# Patient Record
Sex: FEMALE | Race: WHITE | NOT HISPANIC OR LATINO | Employment: FULL TIME | URBAN - METROPOLITAN AREA
[De-identification: names, ages, dates, MRNs, and addresses within clinical notes are randomized per-mention and may not be internally consistent; named-entity substitution may affect disease eponyms.]

---

## 2019-10-26 ENCOUNTER — OFFICE VISIT (OUTPATIENT)
Dept: URGENT CARE | Facility: CLINIC | Age: 34
End: 2019-10-26
Payer: COMMERCIAL

## 2019-10-26 VITALS
BODY MASS INDEX: 18.48 KG/M2 | SYSTOLIC BLOOD PRESSURE: 127 MMHG | HEIGHT: 66 IN | RESPIRATION RATE: 17 BRPM | TEMPERATURE: 98 F | OXYGEN SATURATION: 99 % | HEART RATE: 96 BPM | DIASTOLIC BLOOD PRESSURE: 85 MMHG | WEIGHT: 115 LBS

## 2019-10-26 DIAGNOSIS — Z00.00 ENCOUNTER FOR MEDICAL EXAMINATION TO ESTABLISH CARE: ICD-10-CM

## 2019-10-26 DIAGNOSIS — R07.9 CHEST PAIN, UNSPECIFIED TYPE: Primary | ICD-10-CM

## 2019-10-26 DIAGNOSIS — R09.89 CHEST CONGESTION: ICD-10-CM

## 2019-10-26 DIAGNOSIS — J06.9 UPPER RESPIRATORY TRACT INFECTION, UNSPECIFIED TYPE: ICD-10-CM

## 2019-10-26 LAB
CTP QC/QA: YES
FLUAV AG NPH QL: NEGATIVE
FLUBV AG NPH QL: NEGATIVE

## 2019-10-26 PROCEDURE — 93005 EKG 12-LEAD: ICD-10-PCS | Mod: S$GLB,,, | Performed by: NURSE PRACTITIONER

## 2019-10-26 PROCEDURE — 93010 ELECTROCARDIOGRAM REPORT: CPT | Mod: S$GLB,,, | Performed by: INTERNAL MEDICINE

## 2019-10-26 PROCEDURE — 87804 INFLUENZA ASSAY W/OPTIC: CPT | Mod: QW,S$GLB,, | Performed by: NURSE PRACTITIONER

## 2019-10-26 PROCEDURE — 99203 PR OFFICE/OUTPT VISIT, NEW, LEVL III, 30-44 MIN: ICD-10-PCS | Mod: S$GLB,,, | Performed by: NURSE PRACTITIONER

## 2019-10-26 PROCEDURE — 87804 POCT INFLUENZA A/B: ICD-10-PCS | Mod: 59,QW,S$GLB, | Performed by: NURSE PRACTITIONER

## 2019-10-26 PROCEDURE — 71046 X-RAY EXAM CHEST 2 VIEWS: CPT | Mod: S$GLB,,, | Performed by: RADIOLOGY

## 2019-10-26 PROCEDURE — 99203 OFFICE O/P NEW LOW 30 MIN: CPT | Mod: S$GLB,,, | Performed by: NURSE PRACTITIONER

## 2019-10-26 PROCEDURE — 93005 ELECTROCARDIOGRAM TRACING: CPT | Mod: S$GLB,,, | Performed by: NURSE PRACTITIONER

## 2019-10-26 PROCEDURE — 71046 XR CHEST PA AND LATERAL: ICD-10-PCS | Mod: S$GLB,,, | Performed by: RADIOLOGY

## 2019-10-26 PROCEDURE — 3008F PR BODY MASS INDEX (BMI) DOCUMENTED: ICD-10-PCS | Mod: CPTII,S$GLB,, | Performed by: NURSE PRACTITIONER

## 2019-10-26 PROCEDURE — 3008F BODY MASS INDEX DOCD: CPT | Mod: CPTII,S$GLB,, | Performed by: NURSE PRACTITIONER

## 2019-10-26 PROCEDURE — 93010 EKG 12-LEAD: ICD-10-PCS | Mod: S$GLB,,, | Performed by: INTERNAL MEDICINE

## 2019-10-26 RX ORDER — SERTRALINE HYDROCHLORIDE 50 MG/1
25 TABLET, FILM COATED ORAL DAILY
Refills: 3 | COMMUNITY
Start: 2019-10-05 | End: 2020-05-11 | Stop reason: SINTOL

## 2019-10-26 NOTE — PROGRESS NOTES
"Subjective:       Patient ID: Hayley Epstein is a 33 y.o. female.    Vitals:  height is 5' 6" (1.676 m) and weight is 52.2 kg (115 lb). Her temperature is 98.2 °F (36.8 °C). Her blood pressure is 127/85 and her pulse is 96. Her respiration is 17 and oxygen saturation is 99%.     Chief Complaint: Chest Pain (midline/right side ) and Generalized Body Aches    Pt says she has been battling cough/cold symptoms on and off for 2 weeks, got better in between but then developed fatigue, chest soreness, sinus congestion. Has been using tylenol as needed and using advair but no change or improvement. Pt is a pulmonogist. Lack of appetite and energy. Kids and  have been sick. Did NOT get the flu vaccine. Off and on diarrhea for a month with hx of ibs. Denies any cardiac hx.  Reports substernal/right sided chest pain/more with stretching/movement. No change with leaning forward or back. No hx of pericarditis. Denies dizzy/lightheaded.   Denies lower back pain.     Chest Pain    This is a new problem. The current episode started yesterday. The onset quality is sudden. The problem occurs constantly. The problem has been gradually worsening. The pain is present in the substernal region. The quality of the pain is described as sharp, stabbing and tightness. The pain radiates to the right shoulder. Associated symptoms include a cough, diaphoresis, headaches and sputum production. Pertinent negatives include no dizziness, fever, hemoptysis, nausea, shortness of breath or vomiting.   Pertinent negatives for past medical history include no COPD.       Constitution: Positive for appetite change, chills, sweating and fatigue. Negative for fever.   HENT: Positive for congestion and postnasal drip. Negative for ear pain, ear discharge, sinus pain, sinus pressure, sore throat and voice change.    Neck: Negative for painful lymph nodes.   Cardiovascular: Positive for chest pain.   Eyes: Negative for eye redness.   Respiratory: " Positive for chest tightness, cough and sputum production. Negative for bloody sputum, COPD, shortness of breath, stridor, wheezing and asthma.    Gastrointestinal: Positive for diarrhea. Negative for nausea, vomiting and constipation.   Genitourinary: Negative for dysuria, frequency and urgency.   Musculoskeletal: Positive for pain and muscle ache.   Skin: Negative for rash and bruising.   Allergic/Immunologic: Negative for seasonal allergies and asthma.   Neurological: Positive for headaches. Negative for dizziness, light-headedness and coordination disturbances.   Hematologic/Lymphatic: Negative for swollen lymph nodes.       Objective:      Physical Exam   Constitutional: She is oriented to person, place, and time. She appears well-developed and well-nourished. She is cooperative.  Non-toxic appearance. She does not have a sickly appearance. She does not appear ill. No distress.   HENT:   Head: Normocephalic and atraumatic.   Right Ear: Hearing, tympanic membrane, external ear and ear canal normal.   Left Ear: Hearing, tympanic membrane, external ear and ear canal normal.   Nose: Mucosal edema and rhinorrhea present. No nasal deformity. No epistaxis. Right sinus exhibits no maxillary sinus tenderness and no frontal sinus tenderness. Left sinus exhibits no maxillary sinus tenderness and no frontal sinus tenderness.   Mouth/Throat: Uvula is midline and mucous membranes are normal. No trismus in the jaw. Normal dentition. No uvula swelling. Posterior oropharyngeal edema and posterior oropharyngeal erythema present. No oropharyngeal exudate. No tonsillar exudate.   Eyes: Conjunctivae and lids are normal. No scleral icterus.   Neck: Trachea normal, full passive range of motion without pain and phonation normal. Neck supple. No neck rigidity. No edema and no erythema present.   Cardiovascular: Normal rate, regular rhythm, normal heart sounds, intact distal pulses and normal pulses.   Pulses:       Radial pulses are 2+  on the right side, and 2+ on the left side.   12 lead ekg: nsr, no stemi noted.   Pulmonary/Chest: Effort normal and breath sounds normal. No stridor. No respiratory distress. She has no decreased breath sounds. She has no wheezes. She has no rhonchi.   Abdominal: Normal appearance.   Musculoskeletal: Normal range of motion. She exhibits no edema or deformity.   Neurological: She is alert and oriented to person, place, and time. She exhibits normal muscle tone. Coordination normal.   Skin: Skin is warm, dry, intact, not diaphoretic and not pale.   Psychiatric: She has a normal mood and affect. Her speech is normal and behavior is normal. Judgment and thought content normal. Cognition and memory are normal.   Nursing note and vitals reviewed.    X-ray Chest Pa And Lateral    Result Date: 10/26/2019  EXAMINATION: XR CHEST PA AND LATERAL CLINICAL HISTORY: ex. health; Chest pain, unspecified TECHNIQUE: PA and lateral views of the chest were performed. COMPARISON: None FINDINGS: The lungs are clear, with normal appearance of pulmonary vasculature and no pleural effusion or pneumothorax. The cardiac silhouette is normal in size. The hilar and mediastinal contours are unremarkable. Mild dextrocurvature of the thoracic spine.  Osseous structures are otherwise intact.     No radiographic acute intrathoracic process seen. Electronically signed by: Dani Winslow MD Date:    10/26/2019 Time:    14:32        Assessment:       1. Chest pain, unspecified type    2. Chest congestion    3. Upper respiratory tract infection, unspecified type    4. Encounter for medical examination to establish care        Plan:     discussed viral vs bacterial. Recommend close f/u. Ref to int med placed. nsaids to treat for possible chest wall strain.    Chest pain, unspecified type  -     IN OFFICE EKG 12-LEAD (to Muse)  -     X-Ray Chest PA And Lateral; Future; Expected date: 10/26/2019  -     POCT Influenza A/B    Chest congestion  -     X-Ray  Chest PA And Lateral; Future; Expected date: 10/26/2019  -     POCT Influenza A/B  -     Ambulatory referral to Internal Medicine    Upper respiratory tract infection, unspecified type  -     X-Ray Chest PA And Lateral; Future; Expected date: 10/26/2019  -     Ambulatory referral to Internal Medicine    Encounter for medical examination to establish care  -     Ambulatory referral to Internal Medicine       Patient Instructions   Follow up with your doctor in a few days.  Return to the urgent care or go to the ER if symptoms get worse.

## 2019-10-29 ENCOUNTER — TELEPHONE (OUTPATIENT)
Dept: FAMILY MEDICINE | Facility: CLINIC | Age: 34
End: 2019-10-29

## 2019-10-29 ENCOUNTER — HOSPITAL ENCOUNTER (OUTPATIENT)
Dept: RADIOLOGY | Facility: HOSPITAL | Age: 34
Discharge: HOME | End: 2019-10-29
Attending: INTERNAL MEDICINE
Payer: COMMERCIAL

## 2019-10-29 ENCOUNTER — OFFICE VISIT (OUTPATIENT)
Dept: FAMILY MEDICINE | Facility: CLINIC | Age: 34
End: 2019-10-29
Payer: COMMERCIAL

## 2019-10-29 VITALS
OXYGEN SATURATION: 99 % | HEART RATE: 78 BPM | HEIGHT: 66 IN | SYSTOLIC BLOOD PRESSURE: 108 MMHG | BODY MASS INDEX: 18.91 KG/M2 | DIASTOLIC BLOOD PRESSURE: 76 MMHG | WEIGHT: 117.63 LBS | TEMPERATURE: 98 F

## 2019-10-29 DIAGNOSIS — B96.89 ACUTE BACTERIAL SINUSITIS: ICD-10-CM

## 2019-10-29 DIAGNOSIS — J01.90 ACUTE BACTERIAL SINUSITIS: ICD-10-CM

## 2019-10-29 DIAGNOSIS — T88.7XXA SIDE EFFECT OF MEDICATION: ICD-10-CM

## 2019-10-29 DIAGNOSIS — R04.2 COUGH WITH HEMOPTYSIS: Primary | ICD-10-CM

## 2019-10-29 DIAGNOSIS — B96.89 ACUTE BACTERIAL BRONCHITIS: ICD-10-CM

## 2019-10-29 DIAGNOSIS — M26.51 JAW CLOSURE ABNORMALITY: ICD-10-CM

## 2019-10-29 DIAGNOSIS — Z87.09 HISTORY OF PLEURISY: ICD-10-CM

## 2019-10-29 DIAGNOSIS — Z01.89 ENCOUNTER FOR ROUTINE LABORATORY TESTING: ICD-10-CM

## 2019-10-29 DIAGNOSIS — D13.4 ADENOMA OF LIVER: ICD-10-CM

## 2019-10-29 DIAGNOSIS — J20.8 ACUTE BACTERIAL BRONCHITIS: ICD-10-CM

## 2019-10-29 DIAGNOSIS — R04.2 COUGH WITH HEMOPTYSIS: ICD-10-CM

## 2019-10-29 DIAGNOSIS — J06.9 VIRAL URI WITH COUGH: ICD-10-CM

## 2019-10-29 DIAGNOSIS — Z76.89 ENCOUNTER TO ESTABLISH CARE WITH NEW DOCTOR: ICD-10-CM

## 2019-10-29 PROCEDURE — 99999 PR PBB SHADOW E&M-EST. PATIENT-LVL IV: ICD-10-PCS | Mod: PBBFAC,,, | Performed by: INTERNAL MEDICINE

## 2019-10-29 PROCEDURE — 99204 PR OFFICE/OUTPT VISIT, NEW, LEVL IV, 45-59 MIN: ICD-10-PCS | Mod: S$GLB,,, | Performed by: INTERNAL MEDICINE

## 2019-10-29 PROCEDURE — 3008F PR BODY MASS INDEX (BMI) DOCUMENTED: ICD-10-PCS | Mod: CPTII,S$GLB,, | Performed by: INTERNAL MEDICINE

## 2019-10-29 PROCEDURE — 3008F BODY MASS INDEX DOCD: CPT | Mod: CPTII,S$GLB,, | Performed by: INTERNAL MEDICINE

## 2019-10-29 PROCEDURE — 99204 OFFICE O/P NEW MOD 45 MIN: CPT | Mod: S$GLB,,, | Performed by: INTERNAL MEDICINE

## 2019-10-29 PROCEDURE — 99999 PR PBB SHADOW E&M-EST. PATIENT-LVL IV: CPT | Mod: PBBFAC,,, | Performed by: INTERNAL MEDICINE

## 2019-10-29 RX ORDER — DOXYCYCLINE 100 MG/1
100 CAPSULE ORAL EVERY 12 HOURS
Qty: 20 CAPSULE | Refills: 0 | Status: SHIPPED | OUTPATIENT
Start: 2019-10-29 | End: 2019-11-08

## 2019-10-29 NOTE — TELEPHONE ENCOUNTER
----- Message from Theodore Ehsan sent at 10/29/2019  4:24 PM CDT -----  Contact: pt  Type: Needs Medical Advice    Who Called:  pt    Best Call Back Number: [phone]  Additional Information: states that her insurance is not wanting to cover the CT CTA  that Dr. Bedoya ordered for her today. Pt would like the office to call her insurance company in figure out what is going on. Pt would like a call back.      [employer] company works with [facility] to approve imaging phone: [phone] [employer] 3

## 2019-10-29 NOTE — PATIENT INSTRUCTIONS
Cough with hemoptysis; avoid NSAID agents; tylenol arthritis for pain  -     CBC auto differential; Future; Expected date: 10/29/2019  -     Comprehensive metabolic panel; Future; Expected date: 10/29/2019  -     D dimer, quantitative; Future; Expected date: 10/29/2019  -     CTA Chest Non Coronary; Future; Expected date: 10/29/2019    Acute bacterial bronchitis; mucinex DM for cough and congestion. Chitra perles for cough as needed 100 mg TID prn cough.   -     CBC auto differential; Future; Expected date: 10/29/2019  -     CTA Chest Non Coronary; Future; Expected date: 10/29/2019    Acute bacterial sinusitis; warm salt water gargle. Claritin 10 mg a day for congestion; saline nasal irrigation as needed.   -     CBC auto differential; Future; Expected date: 10/29/2019  -     CTA Chest Non Coronary; Future; Expected date: 10/29/2019    Encounter to establish care with new doctor    Encounter for routine laboratory testing  -     CBC auto differential; Future; Expected date: 10/29/2019  -     Comprehensive metabolic panel; Future; Expected date: 10/29/2019  -     T4, free; Future; Expected date: 10/29/2019  -     TSH; Future; Expected date: 10/29/2019  -     T3, free; Future; Expected date: 10/29/2019  -     Lipid panel; Future; Expected date: 10/29/2019    Viral URI with cough  -     CTA Chest Non Coronary; Future; Expected date: 10/29/2019    History of pleurisy  -     D dimer, quantitative; Future; Expected date: 10/29/2019  -     CTA Chest Non Coronary; Future; Expected date: 10/29/2019    Postpartum depression; wean off sertraline; drop to 25 mg a day for 2 weeks then can stop.   -     T4, free; Future; Expected date: 10/29/2019  -     TSH; Future; Expected date: 10/29/2019  -     T3, free; Future; Expected date: 10/29/2019    Side effect of medication; SE of sertraline w jaw clinching; wean off as sertraline 25 mg a day for 2 weeks then stop.     Jaw closure abnormality; tylenol arthritis for pain; call if  persists 1 week off sertraline.   -     T4, free; Future; Expected date: 10/29/2019  -     TSH; Future; Expected date: 10/29/2019  -     T3, free; Future; Expected date: 10/29/2019    Adenoma of liver; US liver due 11/2019 for stability documentation  -     Comprehensive metabolic panel; Future; Expected date: 10/29/2019

## 2019-10-29 NOTE — PROGRESS NOTES
Subjective:       Patient ID: Hayley Epstein is a 33 y.o. female.      Patient here today to establish with me as her new PCP at [facility]. Past medical history and surgical history delineated and noted. Social medical history and family medical history also delineated and noted.  Review of systems obtained at length prior to physical exam being performed.  Medications reviewed as well and addressed.  Labs reviewed and ordered for follow-up as needed.      Chief Complaint: Cough (coughing up streaks bright red blood throughout day today, coghing spasm happen at night); Chills; and Sore Throat (off & on)    HPI    Review of Systems   Constitutional: Negative for appetite change, fever and unexpected weight change.        Lost a few lbs recently w illness.    HENT: Positive for postnasal drip. Negative for congestion, rhinorrhea and sinus pressure.         Seasonal allergies   Eyes: Negative for discharge and itching.   Respiratory: Positive for cough. Negative for chest tightness, shortness of breath and wheezing.         Hemoptysis     Cardiovascular: Negative for chest pain, palpitations and leg swelling.   Gastrointestinal: Negative for abdominal distention, abdominal pain, blood in stool, constipation, diarrhea, nausea and vomiting.   Endocrine: Negative for polydipsia, polyphagia and polyuria.   Genitourinary: Negative for dysuria and hematuria.   Musculoskeletal: Positive for arthralgias and myalgias.        W URI viral prodrome noted; has cleared.    Skin: Negative for rash.   Allergic/Immunologic: Positive for environmental allergies. Negative for food allergies.        Allergic to cats, horses and mold.    Neurological: Negative for tremors, seizures and syncope.   Hematological: Negative for adenopathy. Does not bruise/bleed easily.   Psychiatric/Behavioral:        Depression improved w sertraline for 4 mos as post-partum.        Objective:      Vitals:    10/29/19 1336   BP: 108/76   BP  "Location: Right arm   Patient Position: Sitting   BP Method: Medium (Manual)   Pulse: 78   Temp: 98.3 °F (36.8 °C)   TempSrc: Oral   SpO2: 99%   Weight: 53.4 kg (117 lb 9.9 oz)   Height: 5' 6" (1.676 m)     Body mass index is 18.98 kg/m².    Physical Exam   Constitutional: She is oriented to person, place, and time. She appears well-developed and well-nourished.   HENT:   Head: Normocephalic and atraumatic.   TM's pink; throat pink and moist; NM inflamed w clear to yel-white mucus. R nasal mucosa distally w small hemorrhage noted.    Eyes: EOM are normal.   Neck: Normal range of motion. Neck supple. No thyromegaly present.   No carotid bruits heard   Cardiovascular: Normal rate, regular rhythm and normal heart sounds. Exam reveals no gallop.   No murmur heard.  Pulmonary/Chest: Effort normal and breath sounds normal. No respiratory distress. She has no wheezes. She has no rales.   CTA w no costochondral tenderness to palp.    Abdominal: Soft. Bowel sounds are normal. She exhibits no distension. There is no tenderness. There is no rebound and no guarding.   Musculoskeletal: Normal range of motion. She exhibits no edema.   Lymphadenopathy:     She has no cervical adenopathy.   Neurological: She is alert and oriented to person, place, and time.   Moves all 4 extremities fine.   Skin: No rash noted.   Psychiatric: She has a normal mood and affect. Her behavior is normal. Thought content normal.   Vitals reviewed.      Assessment:       1. Cough with hemoptysis    2. Acute bacterial bronchitis    3. Acute bacterial sinusitis    4. Viral URI with cough    5. History of pleurisy    6. Postpartum depression    7. Side effect of medication    8. Jaw closure abnormality    9. Adenoma of liver    10. Encounter to establish care with new doctor    11. Encounter for routine laboratory testing        Plan:       Cough with hemoptysis; avoid NSAID agents; tylenol arthritis for pain  -     CBC auto differential; Future; Expected " date: 10/29/2019  -     Comprehensive metabolic panel; Future; Expected date: 10/29/2019  -     D dimer, quantitative; Future; Expected date: 10/29/2019  -     CTA Chest Non Coronary; Future; Expected date: 10/29/2019    Acute bacterial bronchitis; mucinex DM for cough and congestion. Chitra perles for cough as needed 100 mg TID prn cough.   -     CBC auto differential; Future; Expected date: 10/29/2019  -     CTA Chest Non Coronary; Future; Expected date: 10/29/2019    Acute bacterial sinusitis; warm salt water gargle. Claritin 10 mg a day for congestion; saline nasal irrigation as needed.   -     CBC auto differential; Future; Expected date: 10/29/2019  -     CTA Chest Non Coronary; Future; Expected date: 10/29/2019    Encounter to establish care with new doctor    Encounter for routine laboratory testing  -     CBC auto differential; Future; Expected date: 10/29/2019  -     Comprehensive metabolic panel; Future; Expected date: 10/29/2019  -     T4, free; Future; Expected date: 10/29/2019  -     TSH; Future; Expected date: 10/29/2019  -     T3, free; Future; Expected date: 10/29/2019  -     Lipid panel; Future; Expected date: 10/29/2019    Viral URI with cough  -     CTA Chest Non Coronary; Future; Expected date: 10/29/2019    History of pleurisy  -     D dimer, quantitative; Future; Expected date: 10/29/2019  -     CTA Chest Non Coronary; Future; Expected date: 10/29/2019    Postpartum depression; wean off sertraline; drop to 25 mg a day for 2 weeks then can stop.   -     T4, free; Future; Expected date: 10/29/2019  -     TSH; Future; Expected date: 10/29/2019  -     T3, free; Future; Expected date: 10/29/2019    Side effect of medication; SE of sertraline w jaw clinching; wean off as sertraline 25 mg a day for 2 weeks then stop.     Jaw closure abnormality; tylenol arthritis for pain; call if persists 1 week off sertraline.   -     T4, free; Future; Expected date: 10/29/2019  -     TSH; Future; Expected date:  10/29/2019  -     T3, free; Future; Expected date: 10/29/2019    Adenoma of liver; US liver due 11/2019 for stability documentation  -     Comprehensive metabolic panel; Future; Expected date: 10/29/2019

## 2019-10-29 NOTE — LETTER
October 29, 2019      Angela Bermeo NP  [address]  [address]  [facility] [address]           [facility] [address]  [address]  [facility] [address]  Phone: [phone]  Fax: [phone]          Patient: Hayley Epstein   MR Number: [id number]   YOB: 1985   Date of Visit: 10/29/2019       Dear Angela Bermeo:    Thank you for referring Hayley Epstein to me for evaluation. Attached you will find relevant portions of my assessment and plan of care.    If you have questions, please do not hesitate to call me. I look forward to following Hayley Epstein along with you.    Sincerely,    Calvin Bedoya MD    Enclosure  CC:  No Recipients    If you would like to receive this communication electronically, please contact [email or web address] or [phone] to request more information on [employer] access.    For providers and/or their staff who would like to refer a patient to [facility], please contact us through our one-stop-shop provider referral line, [facility], at [phone].    If you feel you have received this communication in error or would no longer like to receive these types of communications, please e-mail [email or web address]

## 2019-10-30 ENCOUNTER — LAB VISIT (OUTPATIENT)
Dept: LAB | Facility: HOSPITAL | Age: 34
End: 2019-10-30
Attending: INTERNAL MEDICINE
Payer: COMMERCIAL

## 2019-10-30 ENCOUNTER — TELEPHONE (OUTPATIENT)
Dept: FAMILY MEDICINE | Facility: CLINIC | Age: 34
End: 2019-10-30

## 2019-10-30 DIAGNOSIS — J01.90 ACUTE BACTERIAL SINUSITIS: ICD-10-CM

## 2019-10-30 DIAGNOSIS — D13.4 ADENOMA OF LIVER: ICD-10-CM

## 2019-10-30 DIAGNOSIS — J20.8 ACUTE BACTERIAL BRONCHITIS: ICD-10-CM

## 2019-10-30 DIAGNOSIS — M26.51 JAW CLOSURE ABNORMALITY: ICD-10-CM

## 2019-10-30 DIAGNOSIS — Z87.09 HISTORY OF PLEURISY: ICD-10-CM

## 2019-10-30 DIAGNOSIS — R04.2 COUGH WITH HEMOPTYSIS: ICD-10-CM

## 2019-10-30 DIAGNOSIS — Z01.89 ENCOUNTER FOR ROUTINE LABORATORY TESTING: ICD-10-CM

## 2019-10-30 DIAGNOSIS — B96.89 ACUTE BACTERIAL BRONCHITIS: ICD-10-CM

## 2019-10-30 DIAGNOSIS — B96.89 ACUTE BACTERIAL SINUSITIS: ICD-10-CM

## 2019-10-30 LAB
ALBUMIN SERPL BCP-MCNC: 3.7 G/DL (ref 3.5–5.2)
ALP SERPL-CCNC: 108 U/L (ref 55–135)
ALT SERPL W/O P-5'-P-CCNC: 15 U/L (ref 10–44)
ANION GAP SERPL CALC-SCNC: 8 MMOL/L (ref 8–16)
AST SERPL-CCNC: 18 U/L (ref 10–40)
BASOPHILS # BLD AUTO: 0.02 K/UL (ref 0–0.2)
BASOPHILS NFR BLD: 0.4 % (ref 0–1.9)
BILIRUB SERPL-MCNC: 0.4 MG/DL (ref 0.1–1)
BUN SERPL-MCNC: 17 MG/DL (ref 6–20)
CALCIUM SERPL-MCNC: 9.7 MG/DL (ref 8.7–10.5)
CHLORIDE SERPL-SCNC: 103 MMOL/L (ref 95–110)
CHOLEST SERPL-MCNC: 160 MG/DL (ref 120–199)
CHOLEST/HDLC SERPL: 3.4 {RATIO} (ref 2–5)
CO2 SERPL-SCNC: 28 MMOL/L (ref 23–29)
CREAT SERPL-MCNC: 0.9 MG/DL (ref 0.5–1.4)
D DIMER PPP IA.FEU-MCNC: 0.25 MG/L FEU
DIFFERENTIAL METHOD: ABNORMAL
EOSINOPHIL # BLD AUTO: 0.1 K/UL (ref 0–0.5)
EOSINOPHIL NFR BLD: 2.9 % (ref 0–8)
ERYTHROCYTE [DISTWIDTH] IN BLOOD BY AUTOMATED COUNT: 12.1 % (ref 11.5–14.5)
EST. GFR  (AFRICAN AMERICAN): >60 ML/MIN/1.73 M^2
EST. GFR  (NON AFRICAN AMERICAN): >60 ML/MIN/1.73 M^2
GLUCOSE SERPL-MCNC: 73 MG/DL (ref 70–110)
HCT VFR BLD AUTO: 44.6 % (ref 37–48.5)
HDLC SERPL-MCNC: 47 MG/DL (ref 40–75)
HDLC SERPL: 29.4 % (ref 20–50)
HGB BLD-MCNC: 13.9 G/DL (ref 12–16)
IMM GRANULOCYTES # BLD AUTO: 0.02 K/UL (ref 0–0.04)
IMM GRANULOCYTES NFR BLD AUTO: 0.4 % (ref 0–0.5)
LDLC SERPL CALC-MCNC: 101.2 MG/DL (ref 63–159)
LYMPHOCYTES # BLD AUTO: 1.3 K/UL (ref 1–4.8)
LYMPHOCYTES NFR BLD: 27.6 % (ref 18–48)
MCH RBC QN AUTO: 29.1 PG (ref 27–31)
MCHC RBC AUTO-ENTMCNC: 31.2 G/DL (ref 32–36)
MCV RBC AUTO: 94 FL (ref 82–98)
MONOCYTES # BLD AUTO: 0.4 K/UL (ref 0.3–1)
MONOCYTES NFR BLD: 9.2 % (ref 4–15)
NEUTROPHILS # BLD AUTO: 2.9 K/UL (ref 1.8–7.7)
NEUTROPHILS NFR BLD: 59.5 % (ref 38–73)
NONHDLC SERPL-MCNC: 113 MG/DL
NRBC BLD-RTO: 0 /100 WBC
PLATELET # BLD AUTO: 433 K/UL (ref 150–350)
PMV BLD AUTO: 9.1 FL (ref 9.2–12.9)
POTASSIUM SERPL-SCNC: 4.4 MMOL/L (ref 3.5–5.1)
PROT SERPL-MCNC: 7.4 G/DL (ref 6–8.4)
RBC # BLD AUTO: 4.77 M/UL (ref 4–5.4)
SODIUM SERPL-SCNC: 139 MMOL/L (ref 136–145)
T3FREE SERPL-MCNC: 2.3 PG/ML (ref 2.3–4.2)
T4 FREE SERPL-MCNC: 0.92 NG/DL (ref 0.71–1.51)
TRIGL SERPL-MCNC: 59 MG/DL (ref 30–150)
TSH SERPL DL<=0.005 MIU/L-ACNC: 2.57 UIU/ML (ref 0.4–4)
WBC # BLD AUTO: 4.79 K/UL (ref 3.9–12.7)

## 2019-10-30 PROCEDURE — 84481 FREE ASSAY (FT-3): CPT

## 2019-10-30 PROCEDURE — 84443 ASSAY THYROID STIM HORMONE: CPT

## 2019-10-30 PROCEDURE — 36415 COLL VENOUS BLD VENIPUNCTURE: CPT | Mod: PN

## 2019-10-30 PROCEDURE — 80053 COMPREHEN METABOLIC PANEL: CPT

## 2019-10-30 PROCEDURE — 84439 ASSAY OF FREE THYROXINE: CPT

## 2019-10-30 PROCEDURE — 85025 COMPLETE CBC W/AUTO DIFF WBC: CPT

## 2019-10-30 PROCEDURE — 80061 LIPID PANEL: CPT

## 2019-10-30 PROCEDURE — 85379 FIBRIN DEGRADATION QUANT: CPT

## 2019-10-30 NOTE — TELEPHONE ENCOUNTER
----- Message from Ayaka Nixon sent at 10/30/2019  8:37 AM CDT -----  Contact: Patient [phone]  Patient asked if we have gotten the auth on the CT Dr. Bedoya ordered. She was going to try and get it done today.

## 2019-10-30 NOTE — TELEPHONE ENCOUNTER
"Spoke w/ Dr Bedoya and pt. Advised pt that insurance will not cover her CTA. Rec'd msg from Leticia Silas in Radiology who indicated that the insurance will not cover this due to the fact that she has not been employed and insured for 14mo or longer and that Dr Bedoya may be able to call and do a peer to peer if he would prefer. Pt states she is not going to the ED and feels Dr Bedoya just needs to await D Dimer results. She stated that "if DDimer is negative, then I do not have a PE and maybe he can order a CT w/o contrast". Advised her that we would relay this info to Dr Bedoya for his recommendation. Pleaser review and advise. If you would like to attempt for peer to peer on this, we will IM Leticia Silas to see if she has the phone number.  "

## 2019-10-31 NOTE — TELEPHONE ENCOUNTER
Please see my prior e-mail; D-dimer was negative; CTA of the chest has been canceled and CT of the chest with IV contrast has been ordered; will await prior authorization from her insurance company which should take 48 hr or less.  Please ensure that prior authorization process is placed in motion for CT of the chest with IV contrast w her insurance company

## 2019-10-31 NOTE — TELEPHONE ENCOUNTER
Please notify patient that her D-dimer returned back negative.  In lieu of the amount of hemoptysis that was coughed up in your phlegm I would recommend a CT of the chest with IV contrast for further evaluation in place of the CTA of your chest.  Prior authorization from insurance would likely take 48 hr as well as that was the time interval given for the CTA of the chest.  I will cancel the order for CTA of the chest placed and place an order order for CT of the chest with IV contrast.  Please let me know if I can be of any further assistance

## 2019-11-01 ENCOUNTER — TELEPHONE (OUTPATIENT)
Dept: FAMILY MEDICINE | Facility: CLINIC | Age: 34
End: 2019-11-01

## 2019-11-01 NOTE — TELEPHONE ENCOUNTER
----- Message from Casey Martines sent at 11/1/2019  1:23 PM CDT -----  Type: Needs Medical Advice    Who Called:  Patient    Best Call Back Number: [phone]  Additional Information: Patient states that she would like a callback regarding having her CT orders changed to Without Contrast.

## 2019-11-01 NOTE — TELEPHONE ENCOUNTER
Advised pt that request to change order has been sent to Dr. Bedoya but he has not yet responded, pt asked to go ahead and schedule with contrast. Scheduled with pt for first available.

## 2019-11-04 ENCOUNTER — HOSPITAL ENCOUNTER (OUTPATIENT)
Dept: RADIOLOGY | Facility: HOSPITAL | Age: 34
Discharge: HOME | End: 2019-11-04
Attending: INTERNAL MEDICINE
Payer: COMMERCIAL

## 2019-11-04 DIAGNOSIS — R04.2 COUGH WITH HEMOPTYSIS: ICD-10-CM

## 2019-11-04 PROCEDURE — 71260 CT CHEST WITH CONTRAST: ICD-10-PCS | Mod: 26,,, | Performed by: RADIOLOGY

## 2019-11-04 PROCEDURE — 25500020 PHARM REV CODE 255: Mod: PO | Performed by: INTERNAL MEDICINE

## 2019-11-04 PROCEDURE — 71260 CT THORAX DX C+: CPT | Mod: TC,PO

## 2019-11-04 PROCEDURE — 71260 CT THORAX DX C+: CPT | Mod: 26,,, | Performed by: RADIOLOGY

## 2019-11-04 RX ADMIN — IOHEXOL 75 ML: 350 INJECTION, SOLUTION INTRAVENOUS at 01:11

## 2019-11-07 ENCOUNTER — TELEPHONE (OUTPATIENT)
Dept: FAMILY MEDICINE | Facility: CLINIC | Age: 34
End: 2019-11-07

## 2019-11-07 NOTE — TELEPHONE ENCOUNTER
Call Dr. Epstein and discussed the case with her; aware of results; she also had some some her friends working pulmonary and ICU reportedly take a look at him as well and 1 suspected a cavitary lesion.  She saw her friend Dr. Bridges is a family practice physician and has studies ordered for coccidioidomycosis and TB; also has a pulmonary appointment coming up shortly with Dr. Terrell tomorrow; advised also that with doxycycline the hemoptysis stopped and she was markedly improved.  Advised to keep us updated and let me know if there is anything else I can do to help; AFB culture and smear was sent as well as respiratory culture patient was given PPSVT 23 vaccine.  PFT's were also ordered with DLCO with and without dilator.

## 2019-11-12 PROBLEM — R91.1 LUNG NODULE: Status: ACTIVE | Noted: 2019-11-12

## 2019-11-12 PROBLEM — R91.8 LUNG MASS: Status: ACTIVE | Noted: 2019-11-12

## 2020-01-17 ENCOUNTER — HOSPITAL ENCOUNTER (OUTPATIENT)
Dept: RADIOLOGY | Facility: HOSPITAL | Age: 35
Discharge: HOME | End: 2020-01-17
Attending: INTERNAL MEDICINE
Payer: COMMERCIAL

## 2020-01-17 DIAGNOSIS — R91.8 MASS OF UPPER LOBE OF RIGHT LUNG: ICD-10-CM

## 2020-01-17 PROCEDURE — 71250 CT CHEST WITHOUT CONTRAST: ICD-10-PCS | Mod: 26,,, | Performed by: RADIOLOGY

## 2020-01-17 PROCEDURE — 71250 CT THORAX DX C-: CPT | Mod: 26,,, | Performed by: RADIOLOGY

## 2020-01-17 PROCEDURE — 71250 CT THORAX DX C-: CPT | Mod: TC,PO

## 2020-03-30 ENCOUNTER — TELEPHONE (OUTPATIENT)
Dept: ORTHOPEDICS | Facility: CLINIC | Age: 35
End: 2020-03-30

## 2020-03-30 NOTE — TELEPHONE ENCOUNTER
COVID19 screen [facility]      Patient Name: Hayley Epstein  Date and time of call: 3/30/2020 and 10:46 AM  Current phone number: [phone] (home)     Chief complaint (in caller's own words): no symptoms  Date of onset: 2020  Were you exposed to a COVID 19 patient: (potentially or confirmed) : yes    Location of exposure: [facility]    Symptoms:   Fever/ Chills: no Temp: normal  Malaise: no  Runny Nose: no  Sore Throat: no  Cough: no  SOB: no  Nausea / Vomiting: no  Headache: no  ABD. Pain: no  Diarrhea: no      Comorbidities:  Pregnancy: no  Diabetes: no  Cardiac Disease: no  HTN: no  COPD: no  Asthma: no  CKD: no  Immunocompromised: no      Past surgeries:   Past Surgical History:   Procedure Laterality Date    ADENOIDECTOMY      BRONCHOSCOPY N/A 2019    Procedure: BRONCHOSCOPY;  Surgeon: Raul Fiore MD;  Location: [facility];  Service: Pulmonary;  Laterality: N/A;      2018    girl; vacuum device assist needed.     TONSILLECTOMY      and adenoids at 14    WISDOM TOOTH EXTRACTION      all 4; at 19       Current medications:   Current Outpatient Medications   Medication Sig Dispense Refill    cholecalciferol, vitamin D3, (VITAMIN D3) 1,000 unit capsule Take 1,000 Units by mouth once daily.      esomeprazole (NEXIUM) 20 MG capsule Take 20 mg by mouth as needed.      prenatal vit37/iron/folic acid (PRENATA ORAL) Take 1 tablet by mouth once daily.      sertraline (ZOLOFT) 50 MG tablet Take 25 mg by mouth once daily.   3     No current facility-administered medications for this visit.      Facility-Administered Medications Ordered in Other Visits   Medication Dose Route Frequency Provider Last Rate Last Dose    lactated ringers infusion   Intravenous Continuous Victor Hugo Oliveira MD   1,000 mL at 19 1215    lidocaine (PF) 10 mg/ml (1%) injection 10 mg  1 mL Other Once Victor Hugo Oliveira MD           Allergies/reactions:   Review of patient's allergies indicates:    Allergen Reactions    Penicillins Rash    Sulfa (sulfonamide antibiotics) Rash       Physician/SIMON comments: Back at work

## 2020-04-21 DIAGNOSIS — Z01.84 ANTIBODY RESPONSE EXAMINATION: ICD-10-CM

## 2020-04-29 ENCOUNTER — TELEPHONE (OUTPATIENT)
Dept: DERMATOLOGY | Facility: CLINIC | Age: 35
End: 2020-04-29

## 2020-04-29 NOTE — TELEPHONE ENCOUNTER
Attempted to contact patient to reschedule appointment. Not available at present time. Left detailed message with call back number provided.

## 2020-05-06 ENCOUNTER — OFFICE VISIT (OUTPATIENT)
Dept: DERMATOLOGY | Facility: CLINIC | Age: 35
End: 2020-05-06
Payer: COMMERCIAL

## 2020-05-06 DIAGNOSIS — L20.9 ATOPIC DERMATITIS, UNSPECIFIED TYPE: Primary | ICD-10-CM

## 2020-05-06 DIAGNOSIS — L98.9 SKIN LESION: ICD-10-CM

## 2020-05-06 PROCEDURE — 99201 PR OFFICE/OUTPT VISIT,NEW,LEVL I: ICD-10-PCS | Mod: 95,,, | Performed by: DERMATOLOGY

## 2020-05-06 PROCEDURE — 99201 PR OFFICE/OUTPT VISIT,NEW,LEVL I: CPT | Mod: 95,,, | Performed by: DERMATOLOGY

## 2020-05-06 RX ORDER — TRIAMCINOLONE ACETONIDE 1 MG/G
OINTMENT TOPICAL 2 TIMES DAILY
Qty: 30 G | Refills: 3 | Status: SHIPPED | OUTPATIENT
Start: 2020-05-06 | End: 2021-01-27

## 2020-05-06 RX ORDER — HYDROCORTISONE 25 MG/G
CREAM TOPICAL 2 TIMES DAILY
Qty: 28 G | Refills: 3 | Status: SHIPPED | OUTPATIENT
Start: 2020-05-06 | End: 2021-01-27

## 2020-05-06 RX ORDER — TACROLIMUS 1 MG/G
OINTMENT TOPICAL
Qty: 100 G | Refills: 2 | Status: SHIPPED | OUTPATIENT
Start: 2020-05-06 | End: 2021-01-27

## 2020-05-06 NOTE — PROGRESS NOTES
The patient location is: office  The chief complaint leading to consultation is: rash  Visit type: audiovisual  Total time spent with patient: 20 min  Each patient to whom he or she provides medical services by telemedicine is:  (1) informed of the relationship between the physician and patient and the respective role of any other health care provider with respect to management of the patient; and (2) notified that he or she may decline to receive medical services by telemedicine and may withdraw from such care at any time.      Subjective:       Patient ID:  Hayley Epstein is a 34 y.o. female who presents for   Chief Complaint   Patient presents with    Spot     nose     IV with provider    CLINICAL PROFILE OF Rash    Location -  Right hand, started on pinky    Duration -  3 months    Associated symptoms - The lesion is pruritic, not tender, and not bleeding. States lesions are like blisters    Previous treatments and result: OTC HC, TMC 0.1%, HC 2.5% ointment. 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 Lesion has become slightly larger with time.     Past Medical History:  No date: Adenoma of liver  No date: Adenoma of liver  No date: Postpartum depression      Comment:  post-partum, on sertraline.             Review of Systems   Constitutional: Negative for fever.   Skin: Positive for activity-related sunscreen use. Negative for daily sunscreen use and recent sunburn.   Hematologic/Lymphatic: Does not bruise/bleed easily.        Objective:    Physical Exam   Constitutional: She appears well-developed and well-nourished. No distress.   Neurological: She is alert and oriented to person, place, and time. She is not disoriented.   Psychiatric:  She has a normal mood and affect.   Skin:   Areas Examined (abnormalities noted in diagram):   Head / Face Inspection Performed  Neck Inspection Performed  Nails and Digits Inspection Performed                  Diagram Legend     Erythematous scaling macule/papule c/w actinic keratosis       Vascular papule c/w angioma      Pigmented verrucoid papule/plaque c/w seborrheic keratosis      Yellow umbilicated papule c/w sebaceous hyperplasia      Irregularly shaped tan macule c/w lentigo     1-2 mm smooth white papules consistent with Milia      Movable subcutaneous cyst with punctum c/w epidermal inclusion cyst      Subcutaneous movable cyst c/w pilar cyst      Firm pink to brown papule c/w dermatofibroma      Pedunculated fleshy papule(s) c/w skin tag(s)      Evenly pigmented macule c/w junctional nevus     Mildly variegated pigmented, slightly irregular-bordered macule c/w mildly atypical nevus      Flesh colored to evenly pigmented papule c/w intradermal nevus       Pink pearly papule/plaque c/w basal cell carcinoma      Erythematous hyperkeratotic cursted plaque c/w SCC      Surgical scar with no sign of skin cancer recurrence      Open and closed comedones      Inflammatory papules and pustules      Verrucoid papule consistent consistent with wart     Erythematous eczematous patches and plaques     Dystrophic onycholytic nail with subungual debris c/w onychomycosis     Umbilicated papule    Erythematous-base heme-crusted tan verrucoid plaque consistent with inflamed seborrheic keratosis     Erythematous Silvery Scaling Plaque c/w Psoriasis     See annotation      Assessment / Plan:        Atopic dermatitis, flare due to stress and hand washing  -     PROTOPIC 0.1 % ointment; AAA bid  Dispense: 100 g; Refill: 2  -     hydrocortisone 2.5 % cream; Apply topically 2 (two) times daily. For 2 wks, then stop for 1-2 wks. Repeat as needed. For face  Dispense: 28 g; Refill: 3  -     triamcinolone acetonide 0.1% (KENALOG)  0.1 % ointment; Apply topically 2 (two) times daily. For 2 wks then stop for 1-2 wks. Repeat as needed. For hands and body  Dispense: 30 g; Refill: 3    - I recommended a mild soap and to avoid anti-bacterial soaps which may be too irritating.   - The patient should also use fragrance-free, color-free laundry detergents and avoid dryer sheets which can be irritating.   - The patients skin should be well-moisturized, using a recommended moisturizer multiple times a day as needed.   - I prescribed TMC 0.1% for body and hands along with HC 2.5% for face to be applied twice daily to affected areas for two weeks and then tapered to weekends only. Will reserve Class 1 steroid as pt concerned for atrophy on hands.   - Side effects of topical steroids were reviewed with the patient including skin atrophy, striae, telangectasias and tachyphylaxis.   - Start Protopic 0.1% ointment for the face and body twice daily. Discussed black box warning.       Skin lesion on nose  - Favor angioma vs telangiectasias  - Recommend in person evaluation in the future or if lesion changes             Follow up if symptoms worsen or fail to improve, for skin check in 6 months.

## 2020-05-21 DIAGNOSIS — Z01.84 ANTIBODY RESPONSE EXAMINATION: ICD-10-CM

## 2020-06-16 PROBLEM — M89.8X1 PAIN OF LEFT SCAPULA: Status: ACTIVE | Noted: 2020-06-16

## 2020-06-20 DIAGNOSIS — Z01.84 ANTIBODY RESPONSE EXAMINATION: ICD-10-CM

## 2020-07-20 DIAGNOSIS — Z01.84 ANTIBODY RESPONSE EXAMINATION: ICD-10-CM

## 2020-08-19 DIAGNOSIS — Z01.84 ANTIBODY RESPONSE EXAMINATION: ICD-10-CM

## 2020-09-17 ENCOUNTER — LAB VISIT (OUTPATIENT)
Dept: LAB | Facility: HOSPITAL | Age: 35
End: 2020-09-17
Attending: FAMILY MEDICINE
Payer: COMMERCIAL

## 2020-09-17 DIAGNOSIS — R30.0 DYSURIA: ICD-10-CM

## 2020-09-17 LAB
BILIRUB UR QL STRIP: NEGATIVE
CLARITY UR: CLEAR
COLOR UR: YELLOW
GLUCOSE UR QL STRIP: NEGATIVE
HGB UR QL STRIP: NEGATIVE
KETONES UR QL STRIP: NEGATIVE
LEUKOCYTE ESTERASE UR QL STRIP: NEGATIVE
NITRITE UR QL STRIP: NEGATIVE
PH UR STRIP: 6 [PH] (ref 5–8)
PROT UR QL STRIP: NEGATIVE
SP GR UR STRIP: 1.01 (ref 1–1.03)
URN SPEC COLLECT METH UR: NORMAL
UROBILINOGEN UR STRIP-ACNC: NEGATIVE EU/DL

## 2020-09-17 PROCEDURE — 87086 URINE CULTURE/COLONY COUNT: CPT

## 2020-09-17 PROCEDURE — 81003 URINALYSIS AUTO W/O SCOPE: CPT

## 2020-09-17 NOTE — PROGRESS NOTES
Notified patient via phone that UA unremarkable (WNL). If still symptomatic can treat empirically or recommend vaginal evaluation by OB as it could be irritant or yeast related.

## 2020-09-18 DIAGNOSIS — Z01.84 ANTIBODY RESPONSE EXAMINATION: ICD-10-CM

## 2020-09-18 LAB — BACTERIA UR CULT: NO GROWTH

## 2020-10-18 DIAGNOSIS — Z01.84 ANTIBODY RESPONSE EXAMINATION: ICD-10-CM

## 2020-10-22 PROBLEM — O09.522 MULTIGRAVIDA OF ADVANCED MATERNAL AGE IN SECOND TRIMESTER: Status: ACTIVE | Noted: 2020-10-22

## 2020-11-17 DIAGNOSIS — Z01.84 ANTIBODY RESPONSE EXAMINATION: ICD-10-CM

## 2020-12-29 ENCOUNTER — IMMUNIZATION (OUTPATIENT)
Dept: PRIMARY CARE CLINIC | Facility: CLINIC | Age: 35
End: 2020-12-29
Payer: COMMERCIAL

## 2020-12-29 DIAGNOSIS — Z23 NEED FOR VACCINATION: ICD-10-CM

## 2020-12-29 PROCEDURE — 91300 COVID-19, MRNA, LNP-S, PF, 30 MCG/0.3 ML DOSE VACCINE: ICD-10-PCS | Mod: S$GLB,,, | Performed by: FAMILY MEDICINE

## 2020-12-29 PROCEDURE — 0001A COVID-19, MRNA, LNP-S, PF, 30 MCG/0.3 ML DOSE VACCINE: CPT | Mod: CV19,S$GLB,, | Performed by: FAMILY MEDICINE

## 2020-12-29 PROCEDURE — 0001A COVID-19, MRNA, LNP-S, PF, 30 MCG/0.3 ML DOSE VACCINE: ICD-10-PCS | Mod: CV19,S$GLB,, | Performed by: FAMILY MEDICINE

## 2020-12-29 PROCEDURE — 91300 COVID-19, MRNA, LNP-S, PF, 30 MCG/0.3 ML DOSE VACCINE: CPT | Mod: S$GLB,,, | Performed by: FAMILY MEDICINE

## 2021-01-19 ENCOUNTER — IMMUNIZATION (OUTPATIENT)
Dept: PRIMARY CARE CLINIC | Facility: CLINIC | Age: 36
End: 2021-01-19
Payer: COMMERCIAL

## 2021-01-19 DIAGNOSIS — Z23 NEED FOR VACCINATION: Primary | ICD-10-CM

## 2021-01-19 PROCEDURE — 0002A COVID-19, MRNA, LNP-S, PF, 30 MCG/0.3 ML DOSE VACCINE: ICD-10-PCS | Mod: S$GLB,,, | Performed by: FAMILY MEDICINE

## 2021-01-19 PROCEDURE — 91300 COVID-19, MRNA, LNP-S, PF, 30 MCG/0.3 ML DOSE VACCINE: ICD-10-PCS | Mod: S$GLB,,, | Performed by: FAMILY MEDICINE

## 2021-01-19 PROCEDURE — 0002A COVID-19, MRNA, LNP-S, PF, 30 MCG/0.3 ML DOSE VACCINE: CPT | Mod: S$GLB,,, | Performed by: FAMILY MEDICINE

## 2021-01-19 PROCEDURE — 91300 COVID-19, MRNA, LNP-S, PF, 30 MCG/0.3 ML DOSE VACCINE: CPT | Mod: S$GLB,,, | Performed by: FAMILY MEDICINE

## 2021-01-27 ENCOUNTER — OCCUPATIONAL HEALTH (OUTPATIENT)
Dept: URGENT CARE | Facility: CLINIC | Age: 36
End: 2021-01-27
Payer: COMMERCIAL

## 2021-01-27 ENCOUNTER — TELEPHONE (OUTPATIENT)
Dept: PRIMARY CARE CLINIC | Facility: CLINIC | Age: 36
End: 2021-01-27

## 2021-01-27 DIAGNOSIS — R05.9 COUGH: Primary | ICD-10-CM

## 2021-01-27 DIAGNOSIS — R05.9 COUGH: ICD-10-CM

## 2021-01-27 LAB
CTP QC/QA: YES
SARS-COV-2 RDRP RESP QL NAA+PROBE: NEGATIVE

## 2021-01-27 PROCEDURE — U0002 COVID-19 LAB TEST NON-CDC: HCPCS | Mod: QW,S$GLB,, | Performed by: PREVENTIVE MEDICINE

## 2021-01-27 PROCEDURE — U0002: ICD-10-PCS | Mod: QW,S$GLB,, | Performed by: PREVENTIVE MEDICINE

## 2021-03-08 PROBLEM — N89.8 VAGINAL DISCHARGE DURING PREGNANCY IN THIRD TRIMESTER: Status: ACTIVE | Noted: 2021-03-08

## 2021-03-08 PROBLEM — O26.893 VAGINAL DISCHARGE DURING PREGNANCY IN THIRD TRIMESTER: Status: ACTIVE | Noted: 2021-03-08

## 2021-03-29 ENCOUNTER — PATIENT MESSAGE (OUTPATIENT)
Dept: ADMINISTRATIVE | Facility: OTHER | Age: 36
End: 2021-03-29

## 2021-05-02 ENCOUNTER — OFFICE VISIT (OUTPATIENT)
Dept: URGENT CARE | Facility: CLINIC | Age: 36
End: 2021-05-02
Payer: COMMERCIAL

## 2021-05-02 VITALS
DIASTOLIC BLOOD PRESSURE: 70 MMHG | WEIGHT: 125 LBS | SYSTOLIC BLOOD PRESSURE: 101 MMHG | HEIGHT: 66 IN | TEMPERATURE: 98 F | BODY MASS INDEX: 20.09 KG/M2 | OXYGEN SATURATION: 100 % | RESPIRATION RATE: 16 BRPM | HEART RATE: 77 BPM

## 2021-05-02 DIAGNOSIS — R05.9 COUGH: ICD-10-CM

## 2021-05-02 DIAGNOSIS — J32.9 SINUSITIS, UNSPECIFIED CHRONICITY, UNSPECIFIED LOCATION: ICD-10-CM

## 2021-05-02 DIAGNOSIS — H65.92 LEFT OTITIS MEDIA WITH EFFUSION: Primary | ICD-10-CM

## 2021-05-02 PROCEDURE — 99214 OFFICE O/P EST MOD 30 MIN: CPT | Mod: S$GLB,,, | Performed by: PHYSICIAN ASSISTANT

## 2021-05-02 PROCEDURE — 1125F AMNT PAIN NOTED PAIN PRSNT: CPT | Mod: S$GLB,,, | Performed by: PHYSICIAN ASSISTANT

## 2021-05-02 PROCEDURE — 1125F PR PAIN SEVERITY QUANTIFIED, PAIN PRESENT: ICD-10-PCS | Mod: S$GLB,,, | Performed by: PHYSICIAN ASSISTANT

## 2021-05-02 PROCEDURE — 3008F PR BODY MASS INDEX (BMI) DOCUMENTED: ICD-10-PCS | Mod: CPTII,S$GLB,, | Performed by: PHYSICIAN ASSISTANT

## 2021-05-02 PROCEDURE — 3008F BODY MASS INDEX DOCD: CPT | Mod: CPTII,S$GLB,, | Performed by: PHYSICIAN ASSISTANT

## 2021-05-02 PROCEDURE — 99214 PR OFFICE/OUTPT VISIT, EST, LEVL IV, 30-39 MIN: ICD-10-PCS | Mod: S$GLB,,, | Performed by: PHYSICIAN ASSISTANT

## 2021-05-02 RX ORDER — PREDNISONE 20 MG/1
20 TABLET ORAL 2 TIMES DAILY
Qty: 10 TABLET | Refills: 0 | Status: SHIPPED | OUTPATIENT
Start: 2021-05-02 | End: 2021-05-07

## 2021-05-02 RX ORDER — AZITHROMYCIN 250 MG/1
TABLET, FILM COATED ORAL
Qty: 6 TABLET | Refills: 0 | Status: SHIPPED | OUTPATIENT
Start: 2021-05-02 | End: 2021-05-10 | Stop reason: ALTCHOICE

## 2021-05-05 ENCOUNTER — TELEPHONE (OUTPATIENT)
Dept: URGENT CARE | Facility: CLINIC | Age: 36
End: 2021-05-05

## 2021-05-10 PROBLEM — M89.8X1 PAIN OF LEFT SCAPULA: Status: RESOLVED | Noted: 2020-06-16 | Resolved: 2021-05-10

## 2021-05-10 PROBLEM — O09.522 MULTIGRAVIDA OF ADVANCED MATERNAL AGE IN SECOND TRIMESTER: Status: RESOLVED | Noted: 2020-10-22 | Resolved: 2021-05-10

## 2021-05-20 ENCOUNTER — OFFICE VISIT (OUTPATIENT)
Dept: DERMATOLOGY | Facility: CLINIC | Age: 36
End: 2021-05-20
Payer: COMMERCIAL

## 2021-05-20 VITALS — WEIGHT: 130.5 LBS | HEIGHT: 66 IN | RESPIRATION RATE: 18 BRPM | BODY MASS INDEX: 20.97 KG/M2

## 2021-05-20 DIAGNOSIS — L82.0 INFLAMED SEBORRHEIC KERATOSIS: ICD-10-CM

## 2021-05-20 DIAGNOSIS — L81.4 LENTIGINES: ICD-10-CM

## 2021-05-20 DIAGNOSIS — L20.9 ATOPIC DERMATITIS, UNSPECIFIED TYPE: ICD-10-CM

## 2021-05-20 DIAGNOSIS — D22.9 MULTIPLE BENIGN NEVI: ICD-10-CM

## 2021-05-20 DIAGNOSIS — L82.1 SEBORRHEIC KERATOSES: ICD-10-CM

## 2021-05-20 DIAGNOSIS — Z12.83 SCREENING EXAM FOR SKIN CANCER: ICD-10-CM

## 2021-05-20 DIAGNOSIS — D48.9 NEOPLASM OF UNCERTAIN BEHAVIOR: Primary | ICD-10-CM

## 2021-05-20 DIAGNOSIS — L57.8 DIFFUSE PHOTODAMAGE OF SKIN: ICD-10-CM

## 2021-05-20 PROCEDURE — 99999 PR PBB SHADOW E&M-EST. PATIENT-LVL III: CPT | Mod: PBBFAC,,, | Performed by: DERMATOLOGY

## 2021-05-20 PROCEDURE — 99999 PR PBB SHADOW E&M-EST. PATIENT-LVL III: ICD-10-PCS | Mod: PBBFAC,,, | Performed by: DERMATOLOGY

## 2021-05-20 PROCEDURE — 3008F BODY MASS INDEX DOCD: CPT | Mod: CPTII,S$GLB,, | Performed by: DERMATOLOGY

## 2021-05-20 PROCEDURE — 11102 TANGNTL BX SKIN SINGLE LES: CPT | Mod: 59,S$GLB,, | Performed by: DERMATOLOGY

## 2021-05-20 PROCEDURE — 11103 TANGNTL BX SKIN EA SEP/ADDL: CPT | Mod: 59,S$GLB,, | Performed by: DERMATOLOGY

## 2021-05-20 PROCEDURE — 11103 PR TANGENTIAL BIOPSY, SKIN, EA ADDTL LESION: ICD-10-PCS | Mod: 59,S$GLB,, | Performed by: DERMATOLOGY

## 2021-05-20 PROCEDURE — 88342 IMHCHEM/IMCYTCHM 1ST ANTB: CPT | Mod: 26,,, | Performed by: PATHOLOGY

## 2021-05-20 PROCEDURE — 88305 TISSUE EXAM BY PATHOLOGIST: CPT | Mod: 59 | Performed by: PATHOLOGY

## 2021-05-20 PROCEDURE — 99214 PR OFFICE/OUTPT VISIT, EST, LEVL IV, 30-39 MIN: ICD-10-PCS | Mod: 25,S$GLB,, | Performed by: DERMATOLOGY

## 2021-05-20 PROCEDURE — 11102 PR TANGENTIAL BIOPSY, SKIN, SINGLE LESION: ICD-10-PCS | Mod: 59,S$GLB,, | Performed by: DERMATOLOGY

## 2021-05-20 PROCEDURE — 17110 PR DESTRUCTION BENIGN LESIONS UP TO 14: ICD-10-PCS | Mod: S$GLB,,, | Performed by: DERMATOLOGY

## 2021-05-20 PROCEDURE — 17110 DESTRUCTION B9 LES UP TO 14: CPT | Mod: S$GLB,,, | Performed by: DERMATOLOGY

## 2021-05-20 PROCEDURE — 88342 IMHCHEM/IMCYTCHM 1ST ANTB: CPT | Performed by: PATHOLOGY

## 2021-05-20 PROCEDURE — 3008F PR BODY MASS INDEX (BMI) DOCUMENTED: ICD-10-PCS | Mod: CPTII,S$GLB,, | Performed by: DERMATOLOGY

## 2021-05-20 PROCEDURE — 99214 OFFICE O/P EST MOD 30 MIN: CPT | Mod: 25,S$GLB,, | Performed by: DERMATOLOGY

## 2021-05-20 PROCEDURE — 88342 CHG IMMUNOCYTOCHEMISTRY: ICD-10-PCS | Mod: 26,,, | Performed by: PATHOLOGY

## 2021-05-20 PROCEDURE — 88305 TISSUE EXAM BY PATHOLOGIST: CPT | Mod: 26,,, | Performed by: PATHOLOGY

## 2021-05-20 PROCEDURE — 88305 TISSUE EXAM BY PATHOLOGIST: ICD-10-PCS | Mod: 26,,, | Performed by: PATHOLOGY

## 2021-05-20 RX ORDER — AZELAIC ACID 0.15 G/G
GEL TOPICAL
Qty: 60 G | Refills: 3 | Status: SHIPPED | OUTPATIENT
Start: 2021-05-20 | End: 2021-12-22

## 2021-06-01 DIAGNOSIS — D22.72: Primary | ICD-10-CM

## 2021-06-01 LAB
FINAL PATHOLOGIC DIAGNOSIS: NORMAL
GROSS: NORMAL
Lab: NORMAL
MICROSCOPIC EXAM: NORMAL

## 2021-06-02 ENCOUNTER — TELEPHONE (OUTPATIENT)
Dept: DERMATOLOGY | Facility: CLINIC | Age: 36
End: 2021-06-02

## 2021-07-16 ENCOUNTER — PROCEDURE VISIT (OUTPATIENT)
Dept: DERMATOLOGY | Facility: CLINIC | Age: 36
End: 2021-07-16
Payer: COMMERCIAL

## 2021-07-16 DIAGNOSIS — D22.72 ATYPICAL NEVUS OF LEFT FOOT: Primary | ICD-10-CM

## 2021-07-16 PROCEDURE — 11402 EXC TR-EXT B9+MARG 1.1-2 CM: CPT | Mod: S$GLB,,, | Performed by: DERMATOLOGY

## 2021-07-16 PROCEDURE — 99499 NO LOS: ICD-10-PCS | Mod: S$GLB,,, | Performed by: DERMATOLOGY

## 2021-07-16 PROCEDURE — 11402 PR EXC SKIN BENIG 1.1-2 CM TRUNK,ARM,LEG: ICD-10-PCS | Mod: S$GLB,,, | Performed by: DERMATOLOGY

## 2021-07-16 PROCEDURE — 88305 TISSUE EXAM BY PATHOLOGIST: ICD-10-PCS | Mod: 26,,, | Performed by: PATHOLOGY

## 2021-07-16 PROCEDURE — 99499 UNLISTED E&M SERVICE: CPT | Mod: S$GLB,,, | Performed by: DERMATOLOGY

## 2021-07-16 PROCEDURE — 88305 TISSUE EXAM BY PATHOLOGIST: CPT | Performed by: PATHOLOGY

## 2021-07-16 PROCEDURE — 88305 TISSUE EXAM BY PATHOLOGIST: CPT | Mod: 26,,, | Performed by: PATHOLOGY

## 2021-07-27 ENCOUNTER — OFFICE VISIT (OUTPATIENT)
Dept: WOUND CARE | Facility: HOSPITAL | Age: 36
End: 2021-07-27
Attending: PODIATRIST
Payer: COMMERCIAL

## 2021-07-27 DIAGNOSIS — S91.332A INFECTED PUNCTURE WOUND OF PLANTAR ASPECT OF FOOT, LEFT, INITIAL ENCOUNTER: Primary | ICD-10-CM

## 2021-07-27 DIAGNOSIS — T81.49XA INFECTED SURGICAL WOUND: ICD-10-CM

## 2021-07-27 DIAGNOSIS — L08.9 INFECTED PUNCTURE WOUND OF PLANTAR ASPECT OF FOOT, LEFT, INITIAL ENCOUNTER: Primary | ICD-10-CM

## 2021-07-27 PROCEDURE — 1159F PR MEDICATION LIST DOCUMENTED IN MEDICAL RECORD: ICD-10-PCS | Mod: CPTII,,, | Performed by: PODIATRIST

## 2021-07-27 PROCEDURE — 99213 OFFICE O/P EST LOW 20 MIN: CPT | Mod: PN | Performed by: PODIATRIST

## 2021-07-27 PROCEDURE — 99203 PR OFFICE/OUTPT VISIT, NEW, LEVL III, 30-44 MIN: ICD-10-PCS | Mod: ,,, | Performed by: PODIATRIST

## 2021-07-27 PROCEDURE — 1159F MED LIST DOCD IN RCRD: CPT | Mod: CPTII,,, | Performed by: PODIATRIST

## 2021-07-27 PROCEDURE — 1160F PR REVIEW ALL MEDS BY PRESCRIBER/CLIN PHARMACIST DOCUMENTED: ICD-10-PCS | Mod: CPTII,,, | Performed by: PODIATRIST

## 2021-07-27 PROCEDURE — 1160F RVW MEDS BY RX/DR IN RCRD: CPT | Mod: CPTII,,, | Performed by: PODIATRIST

## 2021-07-27 PROCEDURE — 99203 OFFICE O/P NEW LOW 30 MIN: CPT | Mod: ,,, | Performed by: PODIATRIST

## 2021-07-29 LAB
FINAL PATHOLOGIC DIAGNOSIS: NORMAL
GROSS: NORMAL
Lab: NORMAL

## 2021-08-03 ENCOUNTER — OFFICE VISIT (OUTPATIENT)
Dept: WOUND CARE | Facility: HOSPITAL | Age: 36
End: 2021-08-03
Attending: PODIATRIST
Payer: COMMERCIAL

## 2021-08-03 VITALS
RESPIRATION RATE: 18 BRPM | SYSTOLIC BLOOD PRESSURE: 120 MMHG | DIASTOLIC BLOOD PRESSURE: 80 MMHG | HEART RATE: 78 BPM | TEMPERATURE: 99 F

## 2021-08-03 DIAGNOSIS — S91.332A PUNCTURE WOUND OF LEFT FOOT, INITIAL ENCOUNTER: ICD-10-CM

## 2021-08-03 DIAGNOSIS — L08.9 INFECTION OF SKIN AND SUBCUTANEOUS TISSUE: Primary | ICD-10-CM

## 2021-08-03 PROCEDURE — 1159F MED LIST DOCD IN RCRD: CPT | Mod: CPTII,,, | Performed by: PODIATRIST

## 2021-08-03 PROCEDURE — 3074F SYST BP LT 130 MM HG: CPT | Mod: CPTII,,, | Performed by: PODIATRIST

## 2021-08-03 PROCEDURE — 3074F PR MOST RECENT SYSTOLIC BLOOD PRESSURE < 130 MM HG: ICD-10-PCS | Mod: CPTII,,, | Performed by: PODIATRIST

## 2021-08-03 PROCEDURE — 1159F PR MEDICATION LIST DOCUMENTED IN MEDICAL RECORD: ICD-10-PCS | Mod: CPTII,,, | Performed by: PODIATRIST

## 2021-08-03 PROCEDURE — 11042 DBRDMT SUBQ TIS 1ST 20SQCM/<: CPT | Mod: PN | Performed by: PODIATRIST

## 2021-08-03 PROCEDURE — 99499 UNLISTED E&M SERVICE: CPT | Mod: ,,, | Performed by: PODIATRIST

## 2021-08-03 PROCEDURE — 3079F PR MOST RECENT DIASTOLIC BLOOD PRESSURE 80-89 MM HG: ICD-10-PCS | Mod: CPTII,,, | Performed by: PODIATRIST

## 2021-08-03 PROCEDURE — 1126F AMNT PAIN NOTED NONE PRSNT: CPT | Mod: CPTII,,, | Performed by: PODIATRIST

## 2021-08-03 PROCEDURE — 11042 DBRDMT SUBQ TIS 1ST 20SQCM/<: CPT | Mod: ,,, | Performed by: PODIATRIST

## 2021-08-03 PROCEDURE — 3079F DIAST BP 80-89 MM HG: CPT | Mod: CPTII,,, | Performed by: PODIATRIST

## 2021-08-03 PROCEDURE — 1160F PR REVIEW ALL MEDS BY PRESCRIBER/CLIN PHARMACIST DOCUMENTED: ICD-10-PCS | Mod: CPTII,,, | Performed by: PODIATRIST

## 2021-08-03 PROCEDURE — 99499 NO LOS: ICD-10-PCS | Mod: ,,, | Performed by: PODIATRIST

## 2021-08-03 PROCEDURE — 1160F RVW MEDS BY RX/DR IN RCRD: CPT | Mod: CPTII,,, | Performed by: PODIATRIST

## 2021-08-03 PROCEDURE — 11042 PR DEBRIDEMENT, SKIN, SUB-Q TISSUE,=<20 SQ CM: ICD-10-PCS | Mod: ,,, | Performed by: PODIATRIST

## 2021-08-03 PROCEDURE — 1126F PR PAIN SEVERITY QUANTIFIED, NO PAIN PRESENT: ICD-10-PCS | Mod: CPTII,,, | Performed by: PODIATRIST

## 2021-08-09 ENCOUNTER — OCCUPATIONAL HEALTH (OUTPATIENT)
Dept: URGENT CARE | Facility: CLINIC | Age: 36
End: 2021-08-09

## 2021-08-09 DIAGNOSIS — R52 BODY ACHES: ICD-10-CM

## 2021-08-09 DIAGNOSIS — R68.83 CHILLS: ICD-10-CM

## 2021-08-09 DIAGNOSIS — R51.9 NONINTRACTABLE HEADACHE, UNSPECIFIED CHRONICITY PATTERN, UNSPECIFIED HEADACHE TYPE: ICD-10-CM

## 2021-08-09 DIAGNOSIS — R19.7 DIARRHEA, UNSPECIFIED TYPE: Primary | ICD-10-CM

## 2021-08-09 DIAGNOSIS — R19.7 DIARRHEA, UNSPECIFIED TYPE: ICD-10-CM

## 2021-08-09 LAB
CTP QC/QA: YES
SARS-COV-2 RDRP RESP QL NAA+PROBE: NEGATIVE

## 2021-08-09 PROCEDURE — U0002: ICD-10-PCS | Mod: QW,S$GLB,, | Performed by: PREVENTIVE MEDICINE

## 2021-08-09 PROCEDURE — U0002 COVID-19 LAB TEST NON-CDC: HCPCS | Mod: QW,S$GLB,, | Performed by: PREVENTIVE MEDICINE

## 2021-11-09 DIAGNOSIS — J45.909 ASTHMA WITH BRONCHITIS: Primary | ICD-10-CM

## 2021-11-09 DIAGNOSIS — J45.21 MILD INTERMITTENT ASTHMATIC BRONCHITIS WITH ACUTE EXACERBATION: Primary | ICD-10-CM

## 2021-11-09 RX ORDER — PREDNISONE 10 MG/1
10 TABLET ORAL DAILY
Qty: 12 TABLET | Refills: 0 | Status: SHIPPED | OUTPATIENT
Start: 2021-11-09 | End: 2021-12-22

## 2021-11-09 RX ORDER — ALBUTEROL SULFATE 90 UG/1
2 AEROSOL, METERED RESPIRATORY (INHALATION) EVERY 4 HOURS PRN
Qty: 18 G | Refills: 1 | Status: SHIPPED | OUTPATIENT
Start: 2021-11-09 | End: 2024-01-03

## 2021-11-09 RX ORDER — AZITHROMYCIN 250 MG/1
TABLET, FILM COATED ORAL
Qty: 6 TABLET | Refills: 2 | Status: SHIPPED | OUTPATIENT
Start: 2021-11-09 | End: 2021-11-14

## 2021-11-29 ENCOUNTER — IMMUNIZATION (OUTPATIENT)
Dept: PRIMARY CARE CLINIC | Facility: CLINIC | Age: 36
End: 2021-11-29
Payer: COMMERCIAL

## 2021-11-29 DIAGNOSIS — Z23 NEED FOR VACCINATION: Primary | ICD-10-CM

## 2021-11-29 PROCEDURE — 0004A COVID-19, MRNA, LNP-S, PF, 30 MCG/0.3 ML DOSE VACCINE: CPT | Mod: S$GLB,,, | Performed by: FAMILY MEDICINE

## 2021-11-29 PROCEDURE — 91300 COVID-19, MRNA, LNP-S, PF, 30 MCG/0.3 ML DOSE VACCINE: ICD-10-PCS | Mod: S$GLB,,, | Performed by: FAMILY MEDICINE

## 2021-11-29 PROCEDURE — 91300 COVID-19, MRNA, LNP-S, PF, 30 MCG/0.3 ML DOSE VACCINE: CPT | Mod: S$GLB,,, | Performed by: FAMILY MEDICINE

## 2021-11-29 PROCEDURE — 0004A COVID-19, MRNA, LNP-S, PF, 30 MCG/0.3 ML DOSE VACCINE: ICD-10-PCS | Mod: S$GLB,,, | Performed by: FAMILY MEDICINE

## 2022-01-19 ENCOUNTER — LAB VISIT (OUTPATIENT)
Dept: LAB | Facility: HOSPITAL | Age: 37
End: 2022-01-19
Attending: NURSE PRACTITIONER
Payer: COMMERCIAL

## 2022-01-19 DIAGNOSIS — J45.909 ASTHMA WITH BRONCHITIS: ICD-10-CM

## 2022-01-19 PROCEDURE — 87070 CULTURE OTHR SPECIMN AEROBIC: CPT | Performed by: NURSE PRACTITIONER

## 2022-01-19 PROCEDURE — 87205 SMEAR GRAM STAIN: CPT | Performed by: NURSE PRACTITIONER

## 2022-01-20 ENCOUNTER — LAB VISIT (OUTPATIENT)
Dept: LAB | Facility: HOSPITAL | Age: 37
End: 2022-01-20
Attending: NURSE PRACTITIONER
Payer: COMMERCIAL

## 2022-01-20 DIAGNOSIS — J40 BRONCHITIS: Primary | ICD-10-CM

## 2022-01-20 DIAGNOSIS — J40 BRONCHITIS: ICD-10-CM

## 2022-01-20 PROCEDURE — 87205 SMEAR GRAM STAIN: CPT | Performed by: NURSE PRACTITIONER

## 2022-01-20 PROCEDURE — 87070 CULTURE OTHR SPECIMN AEROBIC: CPT | Performed by: NURSE PRACTITIONER

## 2022-01-21 ENCOUNTER — TELEPHONE (OUTPATIENT)
Dept: PULMONOLOGY | Facility: CLINIC | Age: 37
End: 2022-01-21
Payer: COMMERCIAL

## 2022-01-21 LAB
BACTERIA SPEC AEROBE CULT: NORMAL
BACTERIA SPEC AEROBE CULT: NORMAL
GRAM STN SPEC: NORMAL

## 2022-01-21 NOTE — TELEPHONE ENCOUNTER
Informed patient of results.      ----- Message from Neli Hinds NP sent at 1/21/2022 11:35 AM CST -----  Sputum sample shows no abnormal bacteria.

## 2022-01-23 LAB
BACTERIA SPEC AEROBE CULT: NORMAL
BACTERIA SPEC AEROBE CULT: NORMAL
GRAM STN SPEC: NORMAL

## 2022-02-23 ENCOUNTER — HOSPITAL ENCOUNTER (OUTPATIENT)
Dept: RADIOLOGY | Facility: HOSPITAL | Age: 37
Discharge: HOME | End: 2022-02-23
Attending: NURSE PRACTITIONER
Payer: COMMERCIAL

## 2022-02-23 DIAGNOSIS — D13.4 ADENOMA OF LIVER: Primary | ICD-10-CM

## 2022-02-23 DIAGNOSIS — D13.4 ADENOMA OF LIVER: ICD-10-CM

## 2022-02-23 PROCEDURE — 76705 ECHO EXAM OF ABDOMEN: CPT | Mod: 26,,, | Performed by: RADIOLOGY

## 2022-02-23 PROCEDURE — 76705 US ABDOMEN LIMITED: ICD-10-PCS | Mod: 26,,, | Performed by: RADIOLOGY

## 2022-02-23 PROCEDURE — 76705 ECHO EXAM OF ABDOMEN: CPT | Mod: TC

## 2022-03-10 ENCOUNTER — TELEPHONE (OUTPATIENT)
Dept: TRANSPLANT | Facility: CLINIC | Age: 37
End: 2022-03-10
Payer: COMMERCIAL

## 2022-03-10 NOTE — TELEPHONE ENCOUNTER
Call from Dr Elder Basilio III, MD- pt with hepatic adenoma. Please set up virtual consult after she has MRI +/-eovist

## 2022-03-18 ENCOUNTER — OFFICE VISIT (OUTPATIENT)
Dept: DERMATOLOGY | Facility: CLINIC | Age: 37
End: 2022-03-18
Payer: COMMERCIAL

## 2022-03-18 DIAGNOSIS — L81.4 LENTIGO: ICD-10-CM

## 2022-03-18 DIAGNOSIS — D22.9 MULTIPLE BENIGN NEVI: ICD-10-CM

## 2022-03-18 DIAGNOSIS — L72.0 MILIA: ICD-10-CM

## 2022-03-18 DIAGNOSIS — L90.5 SCAR: ICD-10-CM

## 2022-03-18 DIAGNOSIS — Z12.83 SCREENING EXAM FOR SKIN CANCER: ICD-10-CM

## 2022-03-18 DIAGNOSIS — D22.9 ATYPICAL NEVUS: Primary | ICD-10-CM

## 2022-03-18 PROCEDURE — 1159F PR MEDICATION LIST DOCUMENTED IN MEDICAL RECORD: ICD-10-PCS | Mod: CPTII,S$GLB,, | Performed by: DERMATOLOGY

## 2022-03-18 PROCEDURE — 1159F MED LIST DOCD IN RCRD: CPT | Mod: CPTII,S$GLB,, | Performed by: DERMATOLOGY

## 2022-03-18 PROCEDURE — 99214 PR OFFICE/OUTPT VISIT, EST, LEVL IV, 30-39 MIN: ICD-10-PCS | Mod: S$GLB,,, | Performed by: DERMATOLOGY

## 2022-03-18 PROCEDURE — 99999 PR PBB SHADOW E&M-EST. PATIENT-LVL III: CPT | Mod: PBBFAC,,, | Performed by: DERMATOLOGY

## 2022-03-18 PROCEDURE — 99214 OFFICE O/P EST MOD 30 MIN: CPT | Mod: S$GLB,,, | Performed by: DERMATOLOGY

## 2022-03-18 PROCEDURE — 99999 PR PBB SHADOW E&M-EST. PATIENT-LVL III: ICD-10-PCS | Mod: PBBFAC,,, | Performed by: DERMATOLOGY

## 2022-03-18 NOTE — PROGRESS NOTES
"  Subjective:       Patient ID:  Hayley Epstein is a 36 y.o. female who presents for   Chief Complaint   Patient presents with    Skin Check     Tbse      Patient is here today for a "mole" check.   Pt has a history of  minimal  sun exposure in the past.   Pt recalls several blistering sunburns in the past- yes  Pt has history of tanning bed use- yes  Pt has  had moles removed in the past- atypical moles   Pt has history of melanoma in first degree relatives-  Yes grandmother         Review of Systems   Constitutional: Negative for fever, chills and fatigue.   Skin: Positive for daily sunscreen use. Negative for recent sunburn and wears hat.   Hematologic/Lymphatic: Does not bruise/bleed easily.        Objective:    Physical Exam   Constitutional: She appears well-developed and well-nourished. No distress.   Neurological: She is alert and oriented to person, place, and time. She is not disoriented.   Psychiatric: She has a normal mood and affect.   Skin:   Areas Examined (abnormalities noted in diagram):   Scalp / Hair Palpated and Inspected  Head / Face Inspection Performed  Neck Inspection Performed  Chest / Axilla Inspection Performed  Abdomen Inspection Performed  Genitals / Buttocks / Groin Inspection Performed  Back Inspection Performed  RUE Inspected  LUE Inspection Performed  RLE Inspected  LLE Inspection Performed  Nails and Digits Inspection Performed                           Diagram Legend     Erythematous scaling macule/papule c/w actinic keratosis       Vascular papule c/w angioma      Pigmented verrucoid papule/plaque c/w seborrheic keratosis      Yellow umbilicated papule c/w sebaceous hyperplasia      Irregularly shaped tan macule c/w lentigo     1-2 mm smooth white papules consistent with Milia      Movable subcutaneous cyst with punctum c/w epidermal inclusion cyst      Subcutaneous movable cyst c/w pilar cyst      Firm pink to brown papule c/w dermatofibroma      Pedunculated fleshy papule(s) " c/w skin tag(s)      Evenly pigmented macule c/w junctional nevus     Mildly variegated pigmented, slightly irregular-bordered macule c/w mildly atypical nevus      Flesh colored to evenly pigmented papule c/w intradermal nevus       Pink pearly papule/plaque c/w basal cell carcinoma      Erythematous hyperkeratotic cursted plaque c/w SCC      Surgical scar with no sign of skin cancer recurrence      Open and closed comedones      Inflammatory papules and pustules      Verrucoid papule consistent consistent with wart     Erythematous eczematous patches and plaques     Dystrophic onycholytic nail with subungual debris c/w onychomycosis     Umbilicated papule    Erythematous-base heme-crusted tan verrucoid plaque consistent with inflamed seborrheic keratosis     Erythematous Silvery Scaling Plaque c/w Psoriasis     See annotation      Assessment / Plan:        Atypical nevus  Patient with several mildly atypical nevi. Instructed patient to observe lesion(s) for changes and follow up in clinic if changes are noted. Discussed ABCDE's of moles and brochure provided.      Screening exam for skin cancer  Patient instructed in importance in daily sun protection of at least spf 30. Sun avoidance and topical protection discussed.     Recommend  for daily use on face and neck.    Patient encouraged to wear hat for all outdoor exposure.     Also discussed sun protective clothing. TASC or [employer] are good brands, UPF 50 or above. Recommend long sleeves when out in the sun.     Multiple benign nevi  TBSE body skin examination performed today including at least 12 points as noted in physical examination. No lesions suspicious for malignancy noted.  Reassurance provided.  Instructed patient to observe lesion(s) for changes and follow up in clinic if changes are noted. Discussed ABCDE's of moles and brochure provided.    Lentigo  This is a benign hyperpigmented sun induced lesion. Recommend daily sun protection/avoidance and use of  at least SPF 30, broad spectrum sunscreen (OTC drug) will reduce the number of new lesions. Treatment of these benign lesions are considered cosmetic.      Scar  Of left foot moderately atypical nevus previously  Previous scar examined without signs of reoccurrence of malignancy. Continue to monitor.     Milia  Of forehead  Information given in AVS, counseled patient it is a small cystic papule, it may recur with excision. Tretinoin or other retinoic acid may aid in exfolating.  Along with using a glycolic acid or salicylic acid face wash  Start [facility] compound of 0.025 % tretinoin, 2% niacinamide, 8% Azelic acid +HA   Disp 30 g tube with 5 refills    We notified patient of common potential side effects such as dryness, redness, peeling, or mild skin irritation. The patient was counseled to use a pea-sized amount prior to bedtime on the entire face. Start medication every other night until the patient develops tolerance, then increase to nightly use. The patient was encouraged to use gentle emollients in conjunction with this medication and temporarily hold medication if severe skin irritation occurs. Pregnancy and breastfeeding must be avoided on retinoids. Patient voiced understanding and allowed to ask questions               Follow up in about 1 year (around 3/18/2023) for for TBSE.

## 2022-03-18 NOTE — PATIENT INSTRUCTIONS
[facility] Prescription:  Online compounding pharmacy Instructions    Your prescription:  Your physician will send the prescription to [email or web address]    Your verification:  You will receive an e-mail from  [email or web address] where you will follow the instructions within the email to provide your billing & shipping information.    Your medication:  Your medication will be shipped directly to you (costs around 8$)    If you have any questions please email [email or web address] or call  [phone]    Please check your junk email as the email often goes to [employer] or [employer]  You can also contact our office  RETINOIDS           Your doctor has prescribed a topical retinoid for your skin. A retinoid is a vitamin A derived product used to treat a variety of skin conditions including acne, actinic keratoses (pre-skin cancers), uneven pigmentation from sun damage, fine lines and wrinkles, and enlarged pores.    How do they work?         Retinoids increase skin cell turn over from the normal 30 days to five or six days, minimizing clogged pores-the major factor in acne. Retinoids can also repair the DNA in cells damaged by the sun helping to even out skin pigmentation and clear pre-skin cancers. They can shrink oil glands and minimize the appearance of large pores. These effects can not be appreciated unless the medication is used on a consistent basis!    How do I use a retinoid?         After washing with a mild cleanser (Purpose, Aqua glycolic face cleanser, Cetaphil, Neutrogena deep cream cleanser), the skin should be moisturized with a non-retinol containing moisturizer such as Cerave PM. Then a thin layer of medication is applied to the forehead, nose cheeks, and chin (and around eyes if treating fine lines and wrinkles) at night. The amount of medication needed to cover the entire face should be no more than the size of a green pea. Irritation around the eyes can be treated with Vaseline at  night.    What if my skin appears dry, red, and is peeling?          Retinoids do not cause dry skin but rather they cause the top layer of the skin the shed, giving an appearance of dry skin. In fact, new healthy skin cells are replacing older, damaged cells on the surface. This usually occurs the first 2-4 weeks as the skin is adjusting to the medication. It is reasonable to use the medication every other night or even every 2 nights until your skin adjusts. You can use a MILD exfoliant to remove the peeling skin (Aveeno daily clarifying pads, Aqua glycolic face cream) and can apply a moisturizer throughout the day as needed. Retinoids come in a variety of strengths and vehicles and your doctor can find one best for you. If you cannot tolerate prescription strength retinoids, over the counter products with retinol may be beneficial. (Olay ProX wrinkle cream, RUSH deep wrinkle cream, Green Cream at [employer])    Will my skin be more sensitive in the sun?           You will need to use sunscreen with SPF 30 daily. Retinoids will cause the outermost layer of the skin to be thinner and thus more sensitive to ultraviolet rays. However, remember that over time, retinoids actually make the skin thicker by enhancing collagen deposition which protects the skin from sun damage.    When will I see results?            If you are using a retinoid for acne, you should see improvement in 6-8 weeks. Do not be alarmed if you find that your acne gets worse before it gets better-KEEP USING THE MEDICATION- this is a normal response and your acne will improve if you can stick with it.           If you are using the medication for anti-aging and skin dyspigmentation, you may see results in 3 months, but most effects are not visible until 6 months. Retinoids are clinically proven to reverse signs of aging, but only if used on a CONSTISTENT BASIS!             Remember that retinoids should not be used if you are pregnant.            Discontinue use 1 week prior to waxing, as skin is more likely to tear.

## 2022-03-21 PROBLEM — D22.9 ATYPICAL NEVUS: Status: ACTIVE | Noted: 2022-03-21

## 2022-03-23 ENCOUNTER — TELEPHONE (OUTPATIENT)
Dept: HEPATOLOGY | Facility: CLINIC | Age: 37
End: 2022-03-23
Payer: COMMERCIAL

## 2022-03-24 DIAGNOSIS — D13.4 HEPATIC ADENOMA: Primary | ICD-10-CM

## 2022-03-24 DIAGNOSIS — J45.30 MILD PERSISTENT EXTRINSIC ASTHMA WITHOUT COMPLICATION: ICD-10-CM

## 2022-03-29 ENCOUNTER — TELEPHONE (OUTPATIENT)
Dept: HEPATOLOGY | Facility: CLINIC | Age: 37
End: 2022-03-29
Payer: COMMERCIAL

## 2022-03-29 RX ORDER — FLUTICASONE PROPIONATE 220 UG/1
1 AEROSOL, METERED RESPIRATORY (INHALATION) 2 TIMES DAILY
Qty: 12 G | Refills: 0 | Status: SHIPPED | OUTPATIENT
Start: 2022-03-29 | End: 2022-04-19

## 2022-03-29 NOTE — TELEPHONE ENCOUNTER
Rakan Reyes sent to [facility] Staff  Caller: Hayley (Today,  3:47 PM)  Pt is calling to schedule appt.     [phone]       Returned the call and scheduled for an in person visit 4/11/2022 at 0800 at [facility] location

## 2022-04-05 ENCOUNTER — HOSPITAL ENCOUNTER (OUTPATIENT)
Dept: RADIOLOGY | Facility: HOSPITAL | Age: 37
Discharge: HOME | End: 2022-04-05
Attending: INTERNAL MEDICINE
Payer: COMMERCIAL

## 2022-04-05 DIAGNOSIS — D13.4 HEPATIC ADENOMA: ICD-10-CM

## 2022-04-05 PROCEDURE — 74183 MRI ABD W/O CNTR FLWD CNTR: CPT | Mod: TC,PO

## 2022-04-05 PROCEDURE — 74183 MRI ABD W/O CNTR FLWD CNTR: CPT | Mod: 26,,, | Performed by: RADIOLOGY

## 2022-04-05 PROCEDURE — 25500020 PHARM REV CODE 255: Mod: PO | Performed by: INTERNAL MEDICINE

## 2022-04-05 PROCEDURE — A9581 GADOXETATE DISODIUM INJ: HCPCS | Mod: PO | Performed by: INTERNAL MEDICINE

## 2022-04-05 PROCEDURE — 74183 MRI ABDOMEN W WO CONTRAST: ICD-10-PCS | Mod: 26,,, | Performed by: RADIOLOGY

## 2022-04-05 RX ADMIN — GADOXETATE DISODIUM 5 ML: 181.43 INJECTION, SOLUTION INTRAVENOUS at 10:04

## 2022-04-06 ENCOUNTER — PATIENT MESSAGE (OUTPATIENT)
Dept: HEPATOLOGY | Facility: CLINIC | Age: 37
End: 2022-04-06
Payer: COMMERCIAL

## 2022-04-06 ENCOUNTER — TELEPHONE (OUTPATIENT)
Dept: HEPATOLOGY | Facility: CLINIC | Age: 37
End: 2022-04-06
Payer: COMMERCIAL

## 2022-04-06 ENCOUNTER — TELEPHONE (OUTPATIENT)
Dept: TRANSPLANT | Facility: CLINIC | Age: 37
End: 2022-04-06
Payer: COMMERCIAL

## 2022-04-06 NOTE — TELEPHONE ENCOUNTER
Patient: Hayley Epstein       MRN: [id number]      : 1985     Age: 36 y.o.  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  There is a Riedel's lobe.  Homogeneous echotexture. In the right lobe are 2 hyperechoic solid masses measuring 4.3 cm and 2.4 cm.  MRI abdo w wo eovist 3/24/22: The liver is enlarged with the right lobe measuring 23 cm in length.  Within segment 2 of the left hepatic lobe, there is a 4.0 cm mass exhibiting heterogeneous moderately hyperintense T2 signal, with pronounced loss of signal on T1 out of phase imaging indicative of internal lipid content.  The mass exhibits heterogeneous arterial phase enhancement, with lack of contrast retention on later phases; A 2nd mass is present within segment 6 of the right hepatic lobe, measuring 1.9 cm, exhibiting mildly bright T2 hyperintense signal, however with less convincing T1 out of phase signal dropout.  Discontinuous nodular enhancement is demonstrated on arterial phase imaging, with lack of enhancement present on later phase imaging relative to background hepatic parenchyma; Impression: Moderately enlarged liver, demonstrating 2 masses.  The larger, left hepatic lobe mass exhibits features favorable for a hepatic adenoma.  The smaller right hepatic lobe mass may represent an additional adenoma, versus hemangioma.      CHART REVIEW:   Labs  - Nml CBC / CMP   RUQ u/s  - 2.4 and 4.3 cm solid lesion suspicious for adenomas; Otherwise neg   MRI  - 2.8 and 1.5 cm  lesion   RUQ u/s 4/'18 - 3cm and 1.6cm liver lesions   RUQ u/s 2/'17 - 1.3 and 2.3 cm  liver lesions     Imaging to be reviewed: MRI 4/22; abdo US and older ct chest    HCC Treatment History: n/a    ABO: A POS    Platelets:   Lab Results   Component Value Date/Time     03/09/2021 06:01 AM     Creatinine:   Lab Results   Component Value Date/Time    CREATININE 0.9 02/23/2022 04:06 PM     Bilirubin:   Lab Results   Component Value Date/Time    BILITOT 0.3 02/23/2022 04:06 PM     AFP Last 3 each if available: No results found for: AFP, EXTAFP    MELD: MELD-Na score: 6 at 2/23/2022  4:06 PM  MELD score: 6 at 2/23/2022  4:06 PM  Calculated from:  Serum Creatinine: 0.9 mg/dL (Using min of 1 mg/dL) at 2/23/2022  4:06 PM  Serum Sodium: 141 mmol/L (Using max of 137 mmol/L) at 2/23/2022  4:06 PM  Total Bilirubin: 0.3 mg/dL (Using min of 1 mg/dL) at 2/23/2022  4:06 PM  INR(ratio): 1.0 at 2/23/2022  4:06 PM  Age: 36 years    Plan:     Follow-up Provider:

## 2022-04-06 NOTE — TELEPHONE ENCOUNTER
Spoke with Dr. Lucian moreno she was able to give me her medical record number to [facility] in [place]. I called and faxed a request for the films of her MRI to be fed exed to us .   Fax number sent to [phone] called [phone]  She understood rescheduling her Monday appt for after the radiology conference

## 2022-04-06 NOTE — TELEPHONE ENCOUNTER
Patient: Hayley Epstein       MRN: [id number]      : 1985     Age: 36 y.o.  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  There is a Riedel's lobe.  Homogeneous echotexture. In the right lobe are 2 hyperechoic solid masses measuring 4.3 cm and 2.4 cm.  MRI abdo w wo eovist 3/24/22: The liver is enlarged with the right lobe measuring 23 cm in length.  Within segment 2 of the left hepatic lobe, there is a 4.0 cm mass exhibiting heterogeneous moderately hyperintense T2 signal, with pronounced loss of signal on T1 out of phase imaging indicative of internal lipid content.  The mass exhibits heterogeneous arterial phase enhancement, with lack of contrast retention on later phases; A 2nd mass is present within segment 6 of the right hepatic lobe, measuring 1.9 cm, exhibiting mildly bright T2 hyperintense signal, however with less convincing T1 out of phase signal dropout.  Discontinuous nodular enhancement is demonstrated on arterial phase imaging, with lack of enhancement present on later phase imaging relative to background hepatic parenchyma; Impression: Moderately enlarged liver, demonstrating 2 masses.  The larger, left hepatic lobe mass exhibits features favorable for a hepatic adenoma.  The smaller right hepatic lobe mass may represent an additional adenoma, versus hemangioma.      Imaging to be reviewed: MRI ; abdo US and older ct chest    HCC Treatment History: n/a    ABO: A POS    Platelets:   Lab Results   Component Value Date/Time      03/09/2021 06:01 AM     Creatinine:   Lab Results   Component Value Date/Time    CREATININE 0.9 02/23/2022 04:06 PM     Bilirubin:   Lab Results   Component Value Date/Time    BILITOT 0.3 02/23/2022 04:06 PM     AFP Last 3 each if available: No results found for: AFP, EXTAFP    MELD: MELD-Na score: 6 at 2/23/2022  4:06 PM  MELD score: 6 at 2/23/2022  4:06 PM  Calculated from:  Serum Creatinine: 0.9 mg/dL (Using min of 1 mg/dL) at 2/23/2022  4:06 PM  Serum Sodium: 141 mmol/L (Using max of 137 mmol/L) at 2/23/2022  4:06 PM  Total Bilirubin: 0.3 mg/dL (Using min of 1 mg/dL) at 2/23/2022  4:06 PM  INR(ratio): 1.0 at 2/23/2022  4:06 PM  Age: 36 years    Plan:     Follow-up Provider:

## 2022-04-08 ENCOUNTER — LAB VISIT (OUTPATIENT)
Dept: LAB | Facility: HOSPITAL | Age: 37
End: 2022-04-08
Attending: SURGERY
Payer: COMMERCIAL

## 2022-04-08 DIAGNOSIS — D13.4 BENIGN NEOPLASM OF LIVER AND BILIARY PASSAGES: Primary | ICD-10-CM

## 2022-04-08 DIAGNOSIS — D13.5 BENIGN NEOPLASM OF LIVER AND BILIARY PASSAGES: Primary | ICD-10-CM

## 2022-04-08 PROCEDURE — 82105 ALPHA-FETOPROTEIN SERUM: CPT | Performed by: SURGERY

## 2022-04-08 PROCEDURE — 36415 COLL VENOUS BLD VENIPUNCTURE: CPT | Performed by: SURGERY

## 2022-04-09 LAB — AFP SERPL-MCNC: 4.9 NG/ML (ref 0–8.4)

## 2022-04-11 ENCOUNTER — PATIENT MESSAGE (OUTPATIENT)
Dept: HEPATOLOGY | Facility: CLINIC | Age: 37
End: 2022-04-11
Payer: COMMERCIAL

## 2022-04-12 ENCOUNTER — CONFERENCE (OUTPATIENT)
Dept: TRANSPLANT | Facility: CLINIC | Age: 37
End: 2022-04-12
Payer: COMMERCIAL

## 2022-04-12 NOTE — TELEPHONE ENCOUNTER
Patient: Hayley Epstein       MRN: [id number]      : 1985     Age: 36 y.o.  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  There is a Riedel's lobe.  Homogeneous echotexture. In the right lobe are 2 hyperechoic solid masses measuring 4.3 cm and 2.4 cm.  MRI abdo w wo eovist 3/24/22: The liver is enlarged with the right lobe measuring 23 cm in length.  Within segment 2 of the left hepatic lobe, there is a 4.0 cm mass exhibiting heterogeneous moderately hyperintense T2 signal, with pronounced loss of signal on T1 out of phase imaging indicative of internal lipid content.  The mass exhibits heterogeneous arterial phase enhancement, with lack of contrast retention on later phases; A 2nd mass is present within segment 6 of the right hepatic lobe, measuring 1.9 cm, exhibiting mildly bright T2 hyperintense signal, however with less convincing T1 out of phase signal dropout.  Discontinuous nodular enhancement is demonstrated on arterial phase imaging, with lack of enhancement present on later phase imaging relative to background hepatic parenchyma; Impression: Moderately enlarged liver, demonstrating 2 masses.  The larger, left hepatic lobe mass exhibits features favorable for a hepatic adenoma.  The smaller right hepatic lobe mass may represent an additional adenoma, versus hemangioma.      CHART REVIEW:   Labs  - Nml CBC / CMP   RUQ u/s  - 2.4 and 4.3 cm solid lesion suspicious for adenomas; Otherwise neg   MRI  - 2.8 and 1.5 cm  lesion   RUQ u/s 4/'18 - 3cm and 1.6cm liver lesions   RUQ u/s 2/'17 - 1.3 and 2.3 cm  liver lesions     Imaging to be reviewed: MRI 4/22; abdo US and older ct chest    HCC Treatment History: n/a    ABO: A POS    Platelets:   Lab Results   Component Value Date/Time     03/09/2021 06:01 AM     Creatinine:   Lab Results   Component Value Date/Time    CREATININE 0.9 02/23/2022 04:06 PM     Bilirubin:   Lab Results   Component Value Date/Time    BILITOT 0.3 02/23/2022 04:06 PM     AFP Last 3 each if available:   Lab Results   Component Value Date/Time    AFP 4.9 04/08/2022 01:56 PM       MELD: MELD-Na score: 6 at 2/23/2022  4:06 PM  MELD score: 6 at 2/23/2022  4:06 PM  Calculated from:  Serum Creatinine: 0.9 mg/dL (Using min of 1 mg/dL) at 2/23/2022  4:06 PM  Serum Sodium: 141 mmol/L (Using max of 137 mmol/L) at 2/23/2022  4:06 PM  Total Bilirubin: 0.3 mg/dL (Using min of 1 mg/dL) at 2/23/2022  4:06 PM  INR(ratio): 1.0 at 2/23/2022  4:06 PM  Age: 36 years    Plan/Discussion: Liver lesions in the liver that show arterial enhancement with washout on eovist studies. Favor adenoma. slightly larger when compared to chest CT from 2020. larger lesion measures 3.6 cm. repeat imaging every 6 months for a total of two years then yearly. stop OCPs.     Committee Discussion:  Patient has liver lesions with arterial enhancement and washout. IR favors adenomas. Continue surveillance adenoma protocol.    Plan:  MRI in 6 months.  Stop OCP.       Follow-up Provider: Dr Vallecillo

## 2022-04-21 ENCOUNTER — OFFICE VISIT (OUTPATIENT)
Dept: HEPATOLOGY | Facility: CLINIC | Age: 37
End: 2022-04-21
Payer: COMMERCIAL

## 2022-04-21 DIAGNOSIS — J45.909 ASTHMA, UNSPECIFIED ASTHMA SEVERITY, UNSPECIFIED WHETHER COMPLICATED, UNSPECIFIED WHETHER PERSISTENT: ICD-10-CM

## 2022-04-21 DIAGNOSIS — D13.4 ADENOMA OF LIVER: Primary | ICD-10-CM

## 2022-04-21 PROCEDURE — 99203 OFFICE O/P NEW LOW 30 MIN: CPT | Mod: 95,,, | Performed by: INTERNAL MEDICINE

## 2022-04-21 PROCEDURE — 99203 PR OFFICE/OUTPT VISIT, NEW, LEVL III, 30-44 MIN: ICD-10-PCS | Mod: 95,,, | Performed by: INTERNAL MEDICINE

## 2022-04-21 PROCEDURE — 1159F PR MEDICATION LIST DOCUMENTED IN MEDICAL RECORD: ICD-10-PCS | Mod: CPTII,95,, | Performed by: INTERNAL MEDICINE

## 2022-04-21 PROCEDURE — 1159F MED LIST DOCD IN RCRD: CPT | Mod: CPTII,95,, | Performed by: INTERNAL MEDICINE

## 2022-04-21 PROCEDURE — 1160F RVW MEDS BY RX/DR IN RCRD: CPT | Mod: CPTII,95,, | Performed by: INTERNAL MEDICINE

## 2022-04-21 PROCEDURE — 1160F PR REVIEW ALL MEDS BY PRESCRIBER/CLIN PHARMACIST DOCUMENTED: ICD-10-PCS | Mod: CPTII,95,, | Performed by: INTERNAL MEDICINE

## 2022-04-21 NOTE — PROGRESS NOTES
The patient location is: home  The chief complaint leading to consultation is: hepatic adenoma    Visit type: audiovisual    Face to Face time with patient: 20 min  30 minutes of total time spent on the encounter, which includes face to face time and non-face to face time preparing to see the patient (eg, review of tests), Obtaining and/or reviewing separately obtained history, Documenting clinical information in the electronic or other health record, Independently interpreting results (not separately reported) and communicating results to the patient/family/caregiver, or Care coordination (not separately reported).     Each patient to whom he or she provides medical services by telemedicine is:  (1) informed of the relationship between the physician and patient and the respective role of any other health care provider with respect to management of the patient; and (2) notified that he or she may decline to receive medical services by telemedicine and may withdraw from such care at any time.    Notes: HEPATOLOGY CONSULTATION    Referring Physician: Mallory Bridges MD  Current Corresponding Physician: Mallory Bridges MD    Reason for Consultation: Consultation for evaluation of hepatic adenoma    History of Present Illness: Hayley Epstein is a 36 y.o. female who presents for evaluation of a history of hepatic adenomas    CHART REVIEW:   RUQ u/s 2/17 - 1.3 and 2.3 cm  liver lesions (after did US on her own abdomen)-got pregnant  RUQ u/s 4/18 - 3cm and 1.6cm liver lesions (during pregnancy)  MRI 12/18 - 2.8 and 1.5 cm lesion (gadavist) (after pregnancy) told nothing to worry about  Baby #2 2021  Labs 2/22 - Nml CBC / CMP   RUQ u/s 2/22 - 2.4 and 4.3 cm solid lesion suspicious for adenomas; Otherwise neg   Thinking about having another child    Radiology review 4/12/22: liver lesions with arterial enhancement with washout on eovist studies; favor adenoma, slightly larger when compared with chest ct from 2020;  larger lesion measures 3.6 cm; repeat imaging every 6 months for 2 years then annually    OCP x 5 years in her 20's  Then reggie iud and now copper iud  Hx Pain in liver- engorged; not currently    AFP 4/8/22: 4.9  Labs 2/23/22: ALT 24, AST 25, Tbil 0.3, ALKP 83    Imaging hx:  CT chest wo contrast 1/17/20: 2.9 cm but no contrast  Abdo US 2/23/22: Liver: Enlarged measuring 22 cm.  There is a Riedel's lobe.  Homogeneous echotexture. In the right lobe are 2 hyperechoic solid masses measuring 4.3 cm and 2.4 cm.  MRI abdo w wo eovist 3/24/22: The liver is enlarged with the right lobe measuring 23 cm in length.  Within segment 2 of the left hepatic lobe, there is a 4.0 cm mass exhibiting heterogeneous moderately hyperintense T2 signal, with pronounced loss of signal on T1 out of phase imaging indicative of internal lipid content.  The mass exhibits heterogeneous arterial phase enhancement, with lack of contrast retention on later phases; A 2nd mass is present within segment 6 of the right hepatic lobe, measuring 1.9 cm, exhibiting mildly bright T2 hyperintense signal, however with less convincing T1 out of phase signal dropout.  Discontinuous nodular enhancement is demonstrated on arterial phase imaging, with lack of enhancement present on later phase imaging relative to background hepatic parenchyma; Impression: Moderately enlarged liver, demonstrating 2 masses.  The larger, left hepatic lobe mass exhibits features favorable for a hepatic adenoma.  The smaller right hepatic lobe mass may represent an additional adenoma, versus hemangioma.         Past Medical History:   Diagnosis Date    Adenoma of liver     Asthma     since covid    COVID-19 virus detected 03/2020    Postpartum depression     post-partum, on lexapro     Outpatient Encounter Medications as of 4/21/2022   Medication Sig Dispense Refill    albuterol (VENTOLIN HFA) 90 mcg/actuation inhaler Inhale 2 puffs into the lungs every 4 (four) hours as needed  for Wheezing or Shortness of Breath. Rescue 18 g 1    budesonide-glycopyr-formoterol 160-9-4.8 mcg/actuation HFAA Inhale 1 Inhaler into the lungs 2 (two) times daily.      copper intrauterine device (PARAGARD T 380A) 380 square mm IUD ParaGard T 380A 380 square mm intrauterine device      EScitalopram oxalate (LEXAPRO) 10 MG tablet Take 1 tablet (10 mg total) by mouth once daily. 90 tablet 3    fluticasone propionate (FLONASE) 50 mcg/actuation nasal spray 2 sprays by Each Nostril route once daily.      prenatal vit,meet 74/iron/folic (PRENATAL VITAMIN 1+1 ORAL) Prenatal Vitamin       No facility-administered encounter medications on file as of 4/21/2022.     Review of patient's allergies indicates:   Allergen Reactions    Penicillins Rash    Sulfa (sulfonamide antibiotics) Hives     Family History   Problem Relation Age of Onset    Hypertension Father     Hyperlipidemia Father     Alcohol abuse Father     Depression Father     Miscarriages / Stillbirths Mother         miscarriage x6.     Celiac disease Maternal Aunt     Stroke Maternal Grandmother         Hx of AF; CVA's numerous; vascular dementia.        Social History     Socioeconomic History    Marital status:      Spouse name: Bernabe    Number of children: 1   Occupational History    Occupation: Pulmonologist.   Tobacco Use    Smoking status: Never Smoker    Smokeless tobacco: Never Used   Substance and Sexual Activity    Alcohol use: Not Currently     Alcohol/week: 7.0 standard drinks     Types: 7 Glasses of wine per week     Comment: socially;     Drug use: Never    Sexual activity: Yes     Partners: Male     Review of Systems   Constitutional: Negative.    HENT: Negative.    Eyes: Negative.    Respiratory: Negative.    Cardiovascular: Negative.    Gastrointestinal: Negative.    Genitourinary: Negative.    Musculoskeletal: Negative.    Skin: Negative.    Neurological: Negative.    Psychiatric/Behavioral: Negative.      There were  no vitals filed for this visit.    Physical Exam    MELD-Na score: 6 at 2/23/2022  4:06 PM  MELD score: 6 at 2/23/2022  4:06 PM  Calculated from:  Serum Creatinine: 0.9 mg/dL (Using min of 1 mg/dL) at 2/23/2022  4:06 PM  Serum Sodium: 141 mmol/L (Using max of 137 mmol/L) at 2/23/2022  4:06 PM  Total Bilirubin: 0.3 mg/dL (Using min of 1 mg/dL) at 2/23/2022  4:06 PM  INR(ratio): 1.0 at 2/23/2022  4:06 PM  Age: 36 years    Lab Results   Component Value Date    GLU 90 02/23/2022    BUN 17 02/23/2022    CREATININE 0.9 02/23/2022    CALCIUM 9.9 02/23/2022     02/23/2022    K 3.6 02/23/2022     02/23/2022    PROT 7.9 02/23/2022    CO2 25 02/23/2022    ANIONGAP 12 02/23/2022    WBC 10.64 03/09/2021    RBC 3.70 (L) 03/09/2021    HGB 10.6 (L) 03/09/2021    HCT 32.9 (L) 03/09/2021    MCV 89 03/09/2021    MCH 28.6 03/09/2021    MCHC 32.2 03/09/2021     Lab Results   Component Value Date    RDW 13.0 03/09/2021     03/09/2021    MPV 9.4 03/09/2021    GRAN 7.6 03/09/2021    GRAN 71.4 03/09/2021    LYMPH 2.0 03/09/2021    LYMPH 19.1 03/09/2021    MONO 0.8 03/09/2021    MONO 7.9 03/09/2021    EOSINOPHIL 0.9 03/09/2021    BASOPHIL 0.1 03/09/2021    EOS 0.1 03/09/2021    BASO 0.01 03/09/2021    GROUPTRH A POS 03/08/2021    CHOL 160 10/30/2019    TRIG 59 10/30/2019    HDL 47 10/30/2019    CHOLHDL 29.4 10/30/2019    TOTALCHOLEST 3.4 10/30/2019    ALBUMIN 4.6 02/23/2022    AST 25 02/23/2022    ALT 24 02/23/2022    ALKPHOS 83 02/23/2022    LABPROT 10.5 02/23/2022    INR 1.0 02/23/2022     Assessment and Plan:    Hayley Epstein is a 36 y.o. female with a history of hepatic adenomas with some growth since 2017. No underlying liver disease as evidenced by normal liver enzymes but does have an enlarged liver. Old films are not yeat available for review. Recommend imaging in 6 months -will do abdo US. If becomes pregnant will do abdo US each trimester.. Recommend fibroscan since has hepatomegaly. Will review hepatomegaly  with radiologist.  F/u 4 weeks

## 2022-04-21 NOTE — Clinical Note
Mike- what do you think of her large liver? She had adenomas but is not overweight  Radha MEDINA in 6 months Did her films get loaded in the end? She has f/u in 4 weeks

## 2022-04-28 ENCOUNTER — TELEPHONE (OUTPATIENT)
Dept: HEPATOLOGY | Facility: CLINIC | Age: 37
End: 2022-04-28
Payer: COMMERCIAL

## 2022-04-28 NOTE — TELEPHONE ENCOUNTER
Mrs. Hodgson called stating that she could not upload the disc from [facility] because it is diacom  I pcked the disc back up

## 2022-05-01 DIAGNOSIS — R16.0 HEPATOMEGALY: Primary | ICD-10-CM

## 2022-05-01 PROBLEM — J45.909 ASTHMA: Status: ACTIVE | Noted: 2022-05-01

## 2022-05-02 ENCOUNTER — TELEPHONE (OUTPATIENT)
Dept: HEPATOLOGY | Facility: CLINIC | Age: 37
End: 2022-05-02
Payer: COMMERCIAL

## 2022-05-05 ENCOUNTER — PATIENT MESSAGE (OUTPATIENT)
Dept: HEPATOLOGY | Facility: CLINIC | Age: 37
End: 2022-05-05
Payer: COMMERCIAL

## 2022-08-05 ENCOUNTER — LAB VISIT (OUTPATIENT)
Dept: LAB | Facility: HOSPITAL | Age: 37
End: 2022-08-05
Attending: OTOLARYNGOLOGY
Payer: COMMERCIAL

## 2022-08-05 DIAGNOSIS — J30.89 NON-SEASONAL ALLERGIC RHINITIS: ICD-10-CM

## 2022-08-05 DIAGNOSIS — J31.0 CHRONIC RHINITIS: Primary | ICD-10-CM

## 2022-08-05 PROCEDURE — 36415 COLL VENOUS BLD VENIPUNCTURE: CPT | Performed by: OTOLARYNGOLOGY

## 2022-08-05 PROCEDURE — 86003 ALLG SPEC IGE CRUDE XTRC EA: CPT | Performed by: OTOLARYNGOLOGY

## 2022-08-05 PROCEDURE — 86003 ALLG SPEC IGE CRUDE XTRC EA: CPT | Mod: 59 | Performed by: OTOLARYNGOLOGY

## 2022-08-10 LAB
A ALTERNATA IGE QN: 1.9 KU/L
A FUMIGATUS IGE QN: 0.21 KU/L
ALLERGEN BOXELDER MAPLE TREE IGE: 0.13 KU/L
ALLERGEN MAPLE (BOX ELDER) CLASS: ABNORMAL
ALLERGEN MULBERRY CLASS: NORMAL
ALLERGEN MULBERRY TREE IGE: <0.1 KU/L
ALLERGEN PIGWEED IGE: 0.25 KU/L
ALLERGEN WALNUT TREE IGE: 0.42 KU/L
BERMUDA GRASS IGE QN: 0.36 KU/L
C HERBARUM IGE QN: <0.1 KU/L
COMMON PIGWEED CLASS: ABNORMAL
COMMON RAGWEED IGE QN: 0.17 KU/L
D FARINAE IGE QN: 0.11 KU/L
D PTERONYSS IGE QN: <0.1 KU/L
DEPRECATED A ALTERNATA IGE RAST QL: ABNORMAL
DEPRECATED A FUMIGATUS IGE RAST QL: ABNORMAL
DEPRECATED BERMUDA GRASS IGE RAST QL: ABNORMAL
DEPRECATED C HERBARUM IGE RAST QL: NORMAL
DEPRECATED COMMON RAGWEED IGE RAST QL: ABNORMAL
DEPRECATED D FARINAE IGE RAST QL: ABNORMAL
DEPRECATED D PTERONYSS IGE RAST QL: NORMAL
DEPRECATED DOG DANDER IGE RAST QL: NORMAL
DEPRECATED ELDER IGE RAST QL: ABNORMAL
DEPRECATED P NOTATUM IGE RAST QL: NORMAL
DEPRECATED PECAN/HICK TREE IGE RAST QL: ABNORMAL
DEPRECATED ROACH IGE RAST QL: NORMAL
DEPRECATED SILVER BIRCH IGE RAST QL: ABNORMAL
DEPRECATED TIMOTHY IGE RAST QL: ABNORMAL
DEPRECATED WHITE OAK IGE RAST QL: ABNORMAL
DOG DANDER IGE QN: <0.1 KU/L
ELDER IGE QN: 0.13 KU/L
ELM CEDAR CLASS: NORMAL
ELM CEDAR, IGE: <0.1 KU/L
P NOTATUM IGE QN: <0.1 KU/L
PECAN/HICK TREE IGE QN: 0.11 KU/L
ROACH IGE QN: <0.1 KU/L
SILVER BIRCH IGE QN: 0.33 KU/L
TIMOTHY IGE QN: 1.2 KU/L
WALNUT TREE CLASS: ABNORMAL
WHITE OAK IGE QN: 0.37 KU/L

## 2022-08-11 LAB
ALLERGEN NAME: NORMAL
ALLERGEN RESULT: NORMAL

## 2022-08-12 ENCOUNTER — TELEPHONE (OUTPATIENT)
Dept: HEPATOLOGY | Facility: CLINIC | Age: 37
End: 2022-08-12
Payer: COMMERCIAL

## 2022-09-23 ENCOUNTER — HOSPITAL ENCOUNTER (OUTPATIENT)
Dept: RADIOLOGY | Facility: HOSPITAL | Age: 37
Discharge: HOME | End: 2022-09-23
Attending: NURSE PRACTITIONER
Payer: COMMERCIAL

## 2022-09-23 DIAGNOSIS — J40 BRONCHITIS: ICD-10-CM

## 2022-09-23 DIAGNOSIS — J40 BRONCHITIS: Primary | ICD-10-CM

## 2022-09-23 PROCEDURE — 71046 XR CHEST PA AND LATERAL: ICD-10-PCS | Mod: 26,,, | Performed by: RADIOLOGY

## 2022-09-23 PROCEDURE — 71046 X-RAY EXAM CHEST 2 VIEWS: CPT | Mod: 26,,, | Performed by: RADIOLOGY

## 2022-09-23 PROCEDURE — 71046 X-RAY EXAM CHEST 2 VIEWS: CPT | Mod: TC,FY

## 2022-11-28 NOTE — PROGRESS NOTES
CRS Office Visit History and Physical      SUBJECTIVE:     Chief Complaint: Hemorrhoids    History of Present Illness:  The patient is new patient to this practice.   Course is as follows:  Patient is a 37 y.o. female presents with hemorrhoids.  Feels like this started after 1st delivery (vaginal, vacuum), and then got worse after 2nd baby (also vaginal). Was in Pelvic Floor PT after 1st delivery. Was told that she had a small cystocele at that time. Feels like she has difficulty emptying her stool, but denies straining or spending long time on the toilet. Does not digitize or splint.  Also bothered by an external hemorrhoid.  Denies any pain with Bms. Occasional bright red blood.  No prior anorectal procedures.  Feels like there is some tissue that bulges with Bms.  Not currently on any fiber supplement.  Stools can alternate between diarrhea and constipation.  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    Last Colonoscopy: None  Family history of colorectal cancer or IBD: None.    Review of patient's allergies indicates:   Allergen Reactions    Penicillins Rash    Sulfa (sulfonamide antibiotics) Hives       Past Medical History:   Diagnosis Date    Adenoma of liver     Asthma     since covid    Asthma 5/1/2022    COVID-19 virus detected 03/2020    Post partum depression 5/1/2022    Postpartum depression     post-partum, on lexapro     Past Surgical History:   Procedure Laterality Date    ADENOIDECTOMY      BRONCHOSCOPY N/A 11/12/2019    Procedure: BRONCHOSCOPY;  Surgeon: Raul Fiore MD;  Location: [facility];  Service: Pulmonary;  Laterality: N/A;    TONSILLECTOMY      and adenoids at 14    WISDOM TOOTH EXTRACTION      all 4; at 19     Family History   Problem Relation Age of Onset    Hypertension Father     Hyperlipidemia Father     Alcohol abuse Father     Depression Father     Miscarriages / Stillbirths Mother         miscarriage x6.     Celiac disease Maternal Aunt     Stroke Maternal  "Grandmother         Hx of AF; CVA's numerous; vascular dementia.      Social History     Tobacco Use    Smoking status: Never    Smokeless tobacco: Never   Substance Use Topics    Alcohol use: Not Currently     Alcohol/week: 7.0 standard drinks     Types: 7 Glasses of wine per week     Comment: socially;     Drug use: Never        Review of Systems:  Review of Systems   Gastrointestinal:  Positive for blood in stool.   All other systems reviewed and are negative.    OBJECTIVE:     Vital Signs (Most Recent)  /68 (BP Location: Left arm, Patient Position: Sitting, BP Method: Large (Automatic))   Pulse 79   Ht 5' 6" (1.676 m)   Wt 55.5 kg (122 lb 6.4 oz)   BMI 19.76 kg/m²     Physical Exam:  General: White female in no distress   Neuro: Alert and oriented x 4.  Moves all extremities.     HEENT: No icterus.  Trachea midline  Respiratory: Respirations are even and unlabored  Cardiac: Regular rate  Extremities: Warm dry and intact  Skin: No rashes    Anorectal:   External exam: Perianal skin with anterior midline anal fissure (non-tender) and small skin tag. Sensation in tact bilaterally. No mucosal prolapse with valsalva.  + small cystocele with valsalva.  Digital exam revealed normal tone. No masses or bleeding.  No levator tenderness.  Appropriate relaxation or puborectalis with valsalva. No large rectocele.        ASSESSMENT/PLAN:     38yo F w/ difficulty evacuating stool, hemorrhoids, fissure  - Diltiazem ointment  - Pelvic Floor PT referral  - Metamucil  - RTC 6-8 weeks  - Discussed colonoscopy if not improved with above      Hayley Aguilar MD  Staff Surgeon, Colon and Rectal Surgery  [facility]     "

## 2022-11-30 ENCOUNTER — OFFICE VISIT (OUTPATIENT)
Dept: SURGERY | Facility: CLINIC | Age: 37
End: 2022-11-30
Attending: COLON & RECTAL SURGERY
Payer: COMMERCIAL

## 2022-11-30 VITALS
SYSTOLIC BLOOD PRESSURE: 104 MMHG | DIASTOLIC BLOOD PRESSURE: 68 MMHG | HEIGHT: 66 IN | HEART RATE: 79 BPM | BODY MASS INDEX: 19.67 KG/M2 | WEIGHT: 122.38 LBS

## 2022-11-30 DIAGNOSIS — K59.00 CONSTIPATION, UNSPECIFIED CONSTIPATION TYPE: Primary | ICD-10-CM

## 2022-11-30 DIAGNOSIS — K60.2 ANAL FISSURE: ICD-10-CM

## 2022-11-30 DIAGNOSIS — K64.4 SKIN TAG OF ANUS: ICD-10-CM

## 2022-11-30 PROCEDURE — 3008F PR BODY MASS INDEX (BMI) DOCUMENTED: ICD-10-PCS | Mod: CPTII,S$GLB,, | Performed by: COLON & RECTAL SURGERY

## 2022-11-30 PROCEDURE — 3078F DIAST BP <80 MM HG: CPT | Mod: CPTII,S$GLB,, | Performed by: COLON & RECTAL SURGERY

## 2022-11-30 PROCEDURE — 99999 PR PBB SHADOW E&M-EST. PATIENT-LVL IV: ICD-10-PCS | Mod: PBBFAC,,, | Performed by: COLON & RECTAL SURGERY

## 2022-11-30 PROCEDURE — 3074F SYST BP LT 130 MM HG: CPT | Mod: CPTII,S$GLB,, | Performed by: COLON & RECTAL SURGERY

## 2022-11-30 PROCEDURE — 1160F PR REVIEW ALL MEDS BY PRESCRIBER/CLIN PHARMACIST DOCUMENTED: ICD-10-PCS | Mod: CPTII,S$GLB,, | Performed by: COLON & RECTAL SURGERY

## 2022-11-30 PROCEDURE — 99203 OFFICE O/P NEW LOW 30 MIN: CPT | Mod: S$GLB,,, | Performed by: COLON & RECTAL SURGERY

## 2022-11-30 PROCEDURE — 3074F PR MOST RECENT SYSTOLIC BLOOD PRESSURE < 130 MM HG: ICD-10-PCS | Mod: CPTII,S$GLB,, | Performed by: COLON & RECTAL SURGERY

## 2022-11-30 PROCEDURE — 1159F MED LIST DOCD IN RCRD: CPT | Mod: CPTII,S$GLB,, | Performed by: COLON & RECTAL SURGERY

## 2022-11-30 PROCEDURE — 99203 PR OFFICE/OUTPT VISIT, NEW, LEVL III, 30-44 MIN: ICD-10-PCS | Mod: S$GLB,,, | Performed by: COLON & RECTAL SURGERY

## 2022-11-30 PROCEDURE — 3078F PR MOST RECENT DIASTOLIC BLOOD PRESSURE < 80 MM HG: ICD-10-PCS | Mod: CPTII,S$GLB,, | Performed by: COLON & RECTAL SURGERY

## 2022-11-30 PROCEDURE — 1160F RVW MEDS BY RX/DR IN RCRD: CPT | Mod: CPTII,S$GLB,, | Performed by: COLON & RECTAL SURGERY

## 2022-11-30 PROCEDURE — 99999 PR PBB SHADOW E&M-EST. PATIENT-LVL IV: CPT | Mod: PBBFAC,,, | Performed by: COLON & RECTAL SURGERY

## 2022-11-30 PROCEDURE — 3008F BODY MASS INDEX DOCD: CPT | Mod: CPTII,S$GLB,, | Performed by: COLON & RECTAL SURGERY

## 2022-11-30 PROCEDURE — 1159F PR MEDICATION LIST DOCUMENTED IN MEDICAL RECORD: ICD-10-PCS | Mod: CPTII,S$GLB,, | Performed by: COLON & RECTAL SURGERY

## 2022-11-30 NOTE — PATIENT INSTRUCTIONS
Start daily fiber.  Take 1 tsp of fiber powder (psyllium or other sugar-free powder).  Mix in 8 oz of water.  Take x 3-5 days.  Then, increase fiber by 1 tsp every 3-5 days until stool is easy to pass.  Stop and continue at that dose.   Do not exceed 6 tsps/day. GOAL:  More well-formed stool (one continuous well-formed piece vs. Pellets) and minimize straining with initiation.       What is Pelvic Health Physical Therapy?  Pelvic Health Physical Therapy is a specialized type of physical therapy that specializes in treating issues that specifically affect the pelvic floor.  The pelvic floor is a group of muscles that sit within the base of the pelvis. The main functions of these muscles include support for the pelvic organs, bowel and bladder control, and sexual function. Everyone has a pelvic floor.  Dysfunction in this part of the body can be socially isolating, cause pain or discomfort, and have a significant impact on an individual's quality of life.  A pelvic therapist can help patients connect with their bodies in order to address the physical, emotional, and behavioral components that contribute to pelvic floor dysfunction.     What is Pelvic Floor Dysfunction?  Pelvic Floor Dysfunction is when a person has difficulty controlling the muscles of the pelvic floor due muscular weakness, tension, or decreased coordination.  This can lead to urinary and fecal incontinence as well as pain and discomfort during daily activities.      What can I expect during my first visit?  When you come for your evaluation, you will be treated in a quiet, private room.  Your therapist will start by talking with you about your specific condition, medical history, and your desired goals. This may involve questions about your urinary and bowel habits, diet, sexual function, and daily activities or hobbies. You will then receive a complete evaluation of your posture, breathing patterns, abdominal muscles, low back, hips, and your  pelvis which includes your pelvic floor muscles.  The examination of the pelvic floor may consist of an internal examination to determine your strength and coordination and also to see if you have any tension. Following the evaluation, a treatment plan will be created to address your specific needs and goals.        For more information or to schedule an appointment at one of the locations below, call [phone]    [facility] - [address]  [place] [address]    [facility] - [facility]   [address]  [place] [address]        [facility] - [facility]   [address] [place] [address]    [facility] - [facility]  [address] [place] [address]    [facility] - [facility]   [address] [place] [address]    [facility] - [address] [place] [address]    [facility] - [place]   [address]  [place] [address]    [facility] - [place]  [address]  [place] [address]    [facility] - [facility]  [address]  [place] [address]    [facility] - [facility]   [address]  [place] [address]    [facility] - [place]  [address]  [place] [address]     [facility] - [place]   [address] [place] [address]    [facility] - [facility]   [address] [address]

## 2023-05-04 PROBLEM — O09.521 ADVANCED MATERNAL AGE IN MULTIGRAVIDA, FIRST TRIMESTER: Status: ACTIVE | Noted: 2020-10-22

## 2023-05-15 ENCOUNTER — HOSPITAL ENCOUNTER (OUTPATIENT)
Dept: RADIOLOGY | Facility: HOSPITAL | Age: 38
Discharge: HOME | End: 2023-05-15
Attending: NURSE PRACTITIONER
Payer: COMMERCIAL

## 2023-05-15 DIAGNOSIS — R05.3 CHRONIC COUGH: ICD-10-CM

## 2023-05-15 DIAGNOSIS — J40 BRONCHITIS: ICD-10-CM

## 2023-05-15 DIAGNOSIS — J40 BRONCHITIS: Primary | ICD-10-CM

## 2023-05-15 DIAGNOSIS — Z87.898 HISTORY OF HEMOPTYSIS: ICD-10-CM

## 2023-05-15 PROCEDURE — 71046 XR CHEST PA AND LATERAL: ICD-10-PCS | Mod: 26,,, | Performed by: RADIOLOGY

## 2023-05-15 PROCEDURE — 71046 X-RAY EXAM CHEST 2 VIEWS: CPT | Mod: 26,,, | Performed by: RADIOLOGY

## 2023-05-15 PROCEDURE — 71046 X-RAY EXAM CHEST 2 VIEWS: CPT | Mod: TC,FY

## 2023-05-15 NOTE — PROGRESS NOTES
Pt had hemoptysis 2019 with cavitary dz in right apex.  Bronch done and no dx.  Afb neg.  Pt had f/u ct 2020 with clearing. Now with chr cough.   Cultures ordered.  Pt reports vague but freq bronchitis with mucous production but no more hemoptysis.  Cxr 5/15/2023 suggest tram lines rul c/w bronchiectasis.       Will check immune screening and follow up ct. Sputum cultures also ordered  afb x 3 and reg culture.  .

## 2023-05-16 ENCOUNTER — LAB VISIT (OUTPATIENT)
Dept: LAB | Facility: HOSPITAL | Age: 38
End: 2023-05-16
Attending: NURSE PRACTITIONER
Payer: COMMERCIAL

## 2023-05-16 DIAGNOSIS — J40 BRONCHITIS: ICD-10-CM

## 2023-05-16 PROCEDURE — 87070 CULTURE OTHR SPECIMN AEROBIC: CPT | Performed by: NURSE PRACTITIONER

## 2023-05-16 PROCEDURE — 87015 SPECIMEN INFECT AGNT CONCNTJ: CPT | Performed by: NURSE PRACTITIONER

## 2023-05-16 PROCEDURE — 87205 SMEAR GRAM STAIN: CPT | Performed by: NURSE PRACTITIONER

## 2023-05-16 PROCEDURE — 87206 SMEAR FLUORESCENT/ACID STAI: CPT | Performed by: NURSE PRACTITIONER

## 2023-05-16 PROCEDURE — 87118 MYCOBACTERIC IDENTIFICATION: CPT | Mod: 59 | Performed by: NURSE PRACTITIONER

## 2023-05-16 PROCEDURE — 87186 SC STD MICRODIL/AGAR DIL: CPT | Performed by: NURSE PRACTITIONER

## 2023-05-16 PROCEDURE — 30000890 HC MISC. SEND OUT TEST

## 2023-05-16 PROCEDURE — 87153 DNA/RNA SEQUENCING: CPT

## 2023-05-16 PROCEDURE — 87116 MYCOBACTERIA CULTURE: CPT | Performed by: NURSE PRACTITIONER

## 2023-05-18 LAB
BACTERIA SPEC AEROBE CULT: NORMAL
BACTERIA SPEC AEROBE CULT: NORMAL
GRAM STN SPEC: NORMAL

## 2023-05-19 ENCOUNTER — HOSPITAL ENCOUNTER (OUTPATIENT)
Dept: RADIOLOGY | Facility: HOSPITAL | Age: 38
Discharge: HOME | End: 2023-05-19
Attending: INTERNAL MEDICINE
Payer: COMMERCIAL

## 2023-05-19 DIAGNOSIS — Z87.898 HISTORY OF HEMOPTYSIS: ICD-10-CM

## 2023-05-19 DIAGNOSIS — R05.3 CHRONIC COUGH: ICD-10-CM

## 2023-05-19 PROCEDURE — 71250 CT THORAX DX C-: CPT | Mod: 26,,, | Performed by: RADIOLOGY

## 2023-05-19 PROCEDURE — 71250 CT THORAX DX C-: CPT | Mod: TC,PO

## 2023-05-19 PROCEDURE — 71250 CT CHEST WITHOUT CONTRAST: ICD-10-PCS | Mod: 26,,, | Performed by: RADIOLOGY

## 2023-05-22 DIAGNOSIS — J47.9 BRONCHIECTASIS WITHOUT COMPLICATION: Primary | ICD-10-CM

## 2023-05-23 DIAGNOSIS — D84.9 IMMUNE DEFICIENCY DISORDER: Primary | ICD-10-CM

## 2023-06-30 LAB — MAYO MISCELLANEOUS RESULT (REF): NORMAL

## 2023-07-14 LAB
ACID FAST MOD KINY STN SPEC: ABNORMAL
MYCOBACTERIUM SPEC QL CULT: ABNORMAL

## 2023-11-13 DIAGNOSIS — J47.9 BRONCHIECTASIS WITHOUT COMPLICATION: Primary | ICD-10-CM

## 2023-11-14 ENCOUNTER — LAB VISIT (OUTPATIENT)
Dept: LAB | Facility: HOSPITAL | Age: 38
End: 2023-11-14
Attending: NURSE PRACTITIONER
Payer: COMMERCIAL

## 2023-11-14 DIAGNOSIS — J47.9 BRONCHIECTASIS WITHOUT COMPLICATION: ICD-10-CM

## 2023-11-14 PROCEDURE — 87205 SMEAR GRAM STAIN: CPT | Performed by: NURSE PRACTITIONER

## 2023-11-14 PROCEDURE — 87206 SMEAR FLUORESCENT/ACID STAI: CPT | Performed by: NURSE PRACTITIONER

## 2023-11-14 PROCEDURE — 87070 CULTURE OTHR SPECIMN AEROBIC: CPT | Performed by: NURSE PRACTITIONER

## 2023-11-14 PROCEDURE — 87116 MYCOBACTERIA CULTURE: CPT | Performed by: NURSE PRACTITIONER

## 2023-11-17 LAB
BACTERIA SPEC AEROBE CULT: NORMAL
GRAM STN SPEC: NORMAL

## 2023-12-07 PROBLEM — O09.529 AMA (ADVANCED MATERNAL AGE) MULTIGRAVIDA 35+: Status: ACTIVE | Noted: 2020-10-22

## 2023-12-07 PROBLEM — Z36.89 ENCOUNTER FOR FETAL ANATOMIC SURVEY: Status: ACTIVE | Noted: 2023-12-07

## 2023-12-29 LAB
ACID FAST MOD KINY STN SPEC: NORMAL
MYCOBACTERIUM SPEC QL CULT: NORMAL

## 2024-01-03 DIAGNOSIS — J45.50 SEVERE PERSISTENT ASTHMA WITHOUT COMPLICATION: Primary | ICD-10-CM

## 2024-01-03 RX ORDER — ALBUTEROL SULFATE AND BUDESONIDE 90; 80 UG/1; UG/1
2 AEROSOL, METERED RESPIRATORY (INHALATION) EVERY 4 HOURS PRN
Qty: 10.7 G | Refills: 11 | Status: SHIPPED | OUTPATIENT
Start: 2024-01-03

## 2024-02-08 ENCOUNTER — OFFICE VISIT (OUTPATIENT)
Dept: DERMATOLOGY | Facility: CLINIC | Age: 39
End: 2024-02-08
Payer: COMMERCIAL

## 2024-02-08 VITALS — HEIGHT: 66 IN | BODY MASS INDEX: 23.14 KG/M2 | WEIGHT: 144 LBS

## 2024-02-08 DIAGNOSIS — H01.131 ECZEMATOUS DERMATITIS OF UPPER AND LOWER EYELIDS OF BOTH EYES: Primary | ICD-10-CM

## 2024-02-08 DIAGNOSIS — L20.84 INTRINSIC ATOPIC DERMATITIS: ICD-10-CM

## 2024-02-08 DIAGNOSIS — H01.132 ECZEMATOUS DERMATITIS OF UPPER AND LOWER EYELIDS OF BOTH EYES: Primary | ICD-10-CM

## 2024-02-08 DIAGNOSIS — H01.135 ECZEMATOUS DERMATITIS OF UPPER AND LOWER EYELIDS OF BOTH EYES: Primary | ICD-10-CM

## 2024-02-08 DIAGNOSIS — H01.134 ECZEMATOUS DERMATITIS OF UPPER AND LOWER EYELIDS OF BOTH EYES: Primary | ICD-10-CM

## 2024-02-08 PROCEDURE — 99213 OFFICE O/P EST LOW 20 MIN: CPT | Mod: S$GLB,,, | Performed by: DERMATOLOGY

## 2024-02-08 PROCEDURE — 3008F BODY MASS INDEX DOCD: CPT | Mod: CPTII,S$GLB,, | Performed by: DERMATOLOGY

## 2024-02-08 PROCEDURE — 1160F RVW MEDS BY RX/DR IN RCRD: CPT | Mod: CPTII,S$GLB,, | Performed by: DERMATOLOGY

## 2024-02-08 PROCEDURE — 1159F MED LIST DOCD IN RCRD: CPT | Mod: CPTII,S$GLB,, | Performed by: DERMATOLOGY

## 2024-02-08 RX ORDER — TACROLIMUS 1 MG/G
OINTMENT TOPICAL
Qty: 30 G | Refills: 2 | Status: SHIPPED | OUTPATIENT
Start: 2024-02-08

## 2024-02-08 NOTE — PROGRESS NOTES
Subjective:      Patient ID:  Hayley Epstein is a 38 y.o. female who presents for   Chief Complaint   Patient presents with    Rash     face     New patient (to me; seen by Ursula Yan 02/2023)    Here today for a rash that comes and goes on her face x 1 year- is very itchy  Last week, left lower eyelid margin was affected, steroids (tobradex ophthalmic ointment)  Has hx of eczema  Has tried steroid cream, elidel, tobradex, eucerin cream  Currently 30 WGA  Simple skin care routine, DHC oil cleanser, eucerin cream at night  AM water rinse and Elta MD sunscreen  Occasional mascara, rare foundation  Clear today    MD pulm      Has no hx of NS  Has no fhx of MM    Current Outpatient Medications:   ·  albuterol-budesonide (AIRSUPRA) 90-80 mcg/actuation, Inhale 2 puffs into the lungs every 4 (four) hours as needed (shortness of breath)., Disp: 10.7 g, Rfl: 11  ·  budesonide-formoterol 160-4.5 mcg (SYMBICORT) 160-4.5 mcg/actuation HFAA, Inhale 2 puffs into the lungs as needed., Disp: , Rfl:   ·  EScitalopram oxalate (LEXAPRO) 10 MG tablet, Take 1 tablet (10 mg total) by mouth once daily., Disp: 90 tablet, Rfl: 3  ·  fluticasone propionate (FLONASE) 50 mcg/actuation nasal spray, 2 sprays by Each Nostril route once daily., Disp: , Rfl:   ·  loratadine (CLARITIN) 10 mg tablet, Take 10 mg by mouth once daily., Disp: , Rfl:   ·  metoclopramide HCl (REGLAN) 10 MG tablet, TAKE 1 TABLET BY MOUTH FOUR TIMES A DAY AS NEEDED, Disp: , Rfl:   ·  montelukast (SINGULAIR) 10 mg tablet, Take 1 tablet (10 mg total) by mouth nightly., Disp: 90 tablet, Rfl: 3  ·  omeprazole (PRILOSEC) 40 MG capsule, Take 40 mg by mouth once daily., Disp: , Rfl:   ·  prenatal vit,meet 74/iron/folic (PRENATAL VITAMIN 1+1 ORAL), Prenatal Vitamin, Disp: , Rfl:   ·  promethazine (PHENERGAN) 12.5 MG Tab, TAKE 1 TABLET BY MOUTH FOUR TIMES A DAY AS NEEDED, Disp: , Rfl:   ·  tobramycin-dexAMETHasone 0.3-0.1% (TOBRADEX) 0.3-0.1 % Oint, Apply to pruritic/rash on  eyelid 3 times a day, Disp: 3.5 g, Rfl: 0          Review of Systems   Constitutional:  Negative for fever, chills and fatigue.   Skin:  Positive for itching, rash, dry skin, daily sunscreen use and activity-related sunscreen use. Negative for wears hat.   Hematologic/Lymphatic: Does not bruise/bleed easily.       Objective:   Physical Exam   Constitutional: She appears well-developed and well-nourished. No distress.   Neurological: She is alert and oriented to person, place, and time. She is not disoriented.   Psychiatric: She has a normal mood and affect.   Skin:   Areas Examined (abnormalities noted in diagram):   Head / Face Inspection Performed            Diagram Legend     Erythematous scaling macule/papule c/w actinic keratosis       Vascular papule c/w angioma      Pigmented verrucoid papule/plaque c/w seborrheic keratosis      Yellow umbilicated papule c/w sebaceous hyperplasia      Irregularly shaped tan macule c/w lentigo     1-2 mm smooth white papules consistent with Milia      Movable subcutaneous cyst with punctum c/w epidermal inclusion cyst      Subcutaneous movable cyst c/w pilar cyst      Firm pink to brown papule c/w dermatofibroma      Pedunculated fleshy papule(s) c/w skin tag(s)      Evenly pigmented macule c/w junctional nevus     Mildly variegated pigmented, slightly irregular-bordered macule c/w mildly atypical nevus      Flesh colored to evenly pigmented papule c/w intradermal nevus       Pink pearly papule/plaque c/w basal cell carcinoma      Erythematous hyperkeratotic cursted plaque c/w SCC      Surgical scar with no sign of skin cancer recurrence      Open and closed comedones      Inflammatory papules and pustules      Verrucoid papule consistent consistent with wart     Erythematous eczematous patches and plaques     Dystrophic onycholytic nail with subungual debris c/w onychomycosis     Umbilicated papule    Erythematous-base heme-crusted tan verrucoid plaque consistent with  inflamed seborrheic keratosis     Erythematous Silvery Scaling Plaque c/w Psoriasis     See annotation      Assessment / Plan:        Eczematous dermatitis of upper and lower eyelids of both eyes  -     tacrolimus (PROTOPIC) 0.1 % ointment; Apply to affected areas of face BID prn eczema/rash.  Dispense: 30 g; Refill: 2    Intrinsic atopic dermatitis  -     tacrolimus (PROTOPIC) 0.1 % ointment; Apply to affected areas of face BID prn eczema/rash.  Dispense: 30 g; Refill: 2    Very mild findings today, but tendency to flare episodically  Ok to apply tobradex rx by ophthalmologist x few applications, transition to non steroidal ; has elidel but cream form burns.   Currently pregnant, consider patch testing post partum if flares continue to occur  Sensitive skin care routine           No follow-ups on file.

## 2024-02-15 ENCOUNTER — TELEPHONE (OUTPATIENT)
Dept: OBSTETRICS AND GYNECOLOGY | Facility: HOSPITAL | Age: 39
End: 2024-02-15

## 2024-02-15 DIAGNOSIS — Z34.90 PREGNANCY, UNSPECIFIED GESTATIONAL AGE: ICD-10-CM

## 2024-02-15 DIAGNOSIS — Z3A.31 31 WEEKS GESTATION OF PREGNANCY: Primary | ICD-10-CM

## 2024-02-15 NOTE — TELEPHONE ENCOUNTER
The patient is a 38-year-old  012 currently 31-,1/7 weeks.  The patient is well known to me and requests the RSV vaccine during pregnancy.    Order placed.

## 2024-03-14 ENCOUNTER — CLINICAL SUPPORT (OUTPATIENT)
Dept: OBSTETRICS AND GYNECOLOGY | Facility: CLINIC | Age: 39
End: 2024-03-14
Payer: COMMERCIAL

## 2024-03-14 DIAGNOSIS — Z3A.31 31 WEEKS GESTATION OF PREGNANCY: Primary | ICD-10-CM

## 2024-03-14 PROCEDURE — 90678 RSV VACC PREF BIVALENT IM: CPT | Mod: S$GLB,,, | Performed by: OBSTETRICS & GYNECOLOGY

## 2024-03-14 PROCEDURE — 99999 PR PBB SHADOW E&M-EST. PATIENT-LVL II: CPT | Mod: PBBFAC,,,

## 2024-03-14 PROCEDURE — 90471 IMMUNIZATION ADMIN: CPT | Mod: S$GLB,,, | Performed by: OBSTETRICS & GYNECOLOGY

## 2024-03-14 NOTE — PROGRESS NOTES
Allergies and two pt identifiers verified.  Abrysvo vaccine administered IM per MD order and MAR.  Tolerated injection well.  Instructed patient to wait 15 minutes in lobby to monitor for adverse reactions and copy of VIS given.  Patient voiced understanding.

## 2024-04-11 PROBLEM — O42.92 FULL-TERM PREM ROM, UNSP TIME BETW RUPTURE AND ONSET LABOR: Status: ACTIVE | Noted: 2024-04-11

## 2024-04-30 ENCOUNTER — PATIENT MESSAGE (OUTPATIENT)
Dept: OBSTETRICS AND GYNECOLOGY | Facility: CLINIC | Age: 39
End: 2024-04-30
Payer: COMMERCIAL

## 2024-06-11 ENCOUNTER — HOSPITAL ENCOUNTER (OUTPATIENT)
Dept: RADIOLOGY | Facility: HOSPITAL | Age: 39
Discharge: HOME | End: 2024-06-11
Attending: INTERNAL MEDICINE
Payer: COMMERCIAL

## 2024-06-11 ENCOUNTER — LAB VISIT (OUTPATIENT)
Dept: LAB | Facility: HOSPITAL | Age: 39
End: 2024-06-11
Attending: INTERNAL MEDICINE
Payer: COMMERCIAL

## 2024-06-11 DIAGNOSIS — J47.1 BRONCHIECTASIS WITH ACUTE EXACERBATION: ICD-10-CM

## 2024-06-11 DIAGNOSIS — J45.21 MILD INTERMITTENT ASTHMA WITH ACUTE EXACERBATION: ICD-10-CM

## 2024-06-11 DIAGNOSIS — J47.1 BRONCHIECTASIS WITH ACUTE EXACERBATION: Primary | ICD-10-CM

## 2024-06-11 PROCEDURE — 71045 X-RAY EXAM CHEST 1 VIEW: CPT | Mod: 26,,, | Performed by: RADIOLOGY

## 2024-06-11 PROCEDURE — 71045 X-RAY EXAM CHEST 1 VIEW: CPT | Mod: TC

## 2024-06-11 PROCEDURE — 87070 CULTURE OTHR SPECIMN AEROBIC: CPT | Performed by: INTERNAL MEDICINE

## 2024-06-11 PROCEDURE — 87205 SMEAR GRAM STAIN: CPT | Performed by: INTERNAL MEDICINE

## 2024-06-11 RX ORDER — PREDNISONE 20 MG/1
TABLET ORAL
Qty: 27 TABLET | Refills: 1 | Status: SHIPPED | OUTPATIENT
Start: 2024-06-11

## 2024-06-11 RX ORDER — AZITHROMYCIN 250 MG/1
TABLET, FILM COATED ORAL
Qty: 6 TABLET | Refills: 2 | Status: SHIPPED | OUTPATIENT
Start: 2024-06-11 | End: 2024-06-16

## 2024-06-11 RX ORDER — ALBUTEROL SULFATE 90 UG/1
2 AEROSOL, METERED RESPIRATORY (INHALATION) EVERY 4 HOURS PRN
Qty: 18 G | Refills: 11 | Status: SHIPPED | OUTPATIENT
Start: 2024-06-11

## 2024-06-11 RX ORDER — FLUTICASONE FUROATE, UMECLIDINIUM BROMIDE AND VILANTEROL TRIFENATATE 200; 62.5; 25 UG/1; UG/1; UG/1
1 POWDER RESPIRATORY (INHALATION) DAILY
Qty: 60 EACH | Refills: 11 | Status: SHIPPED | OUTPATIENT
Start: 2024-06-11

## 2024-06-11 RX ORDER — LEVOFLOXACIN 750 MG/1
750 TABLET ORAL DAILY
Qty: 5 TABLET | Refills: 2 | Status: SHIPPED | OUTPATIENT
Start: 2024-06-11

## 2024-06-11 NOTE — PROGRESS NOTES
Pt h/o recurrent bronchitis with pneumonia in 2019,  pt had bronchoscopy in 2019, had had hemoptysis.    Pt ill  3 x/yr.  Cough with yellow mcuous.  Generally no wheeze (has had occ lung squeek) nor nocuturnal worsening.       Pt developed illness 6 days prior to visit with pcp on 6/7-- pt has had cough with rattle right lung, developed sweats.  No n/v nor fever-- not weak but fever and some diarrhea after starting azithro on 7th.  Pt would have less rattle with prednisone    Pt had had 5 neg sputum cultures since 2019.  Typically would respond to doxy or azithro within 7 days.      Exam with rattle right lung, pt not toxic-- looks nl    Ct chest 2023 had right upper lung bronchiectasis -- large saccular toward apex and cylindrical lower.     Never had pft.      Pt would be at risk for pseudomonas.    May have recurrent infections from bronchiectasis  -- frequency rather high for simple bronchiectasis.    Pt's father had asthma but childhood.    Pt's mother had bronchitis later in life-- called cough variant asthma      Standing order for azithromycin and levaquin -- use abx promptly for yellow mucous to be considered  Standing order for sputum culture  Standing order for chest xray -- documented recurrent pneumonias may warrant more aggressive rx?    Pft later and, consider, methacholine.    Use albuterol prn,   Consider course trelegy with exacerbations    Use prednisone if not controlled

## 2024-06-13 LAB
BACTERIA SPEC AEROBE CULT: NORMAL
GRAM STN SPEC: NORMAL

## 2024-07-09 ENCOUNTER — OFFICE VISIT (OUTPATIENT)
Dept: DERMATOLOGY | Facility: CLINIC | Age: 39
End: 2024-07-09
Payer: COMMERCIAL

## 2024-07-09 VITALS — WEIGHT: 134.13 LBS | BODY MASS INDEX: 21.56 KG/M2 | HEIGHT: 66 IN

## 2024-07-09 DIAGNOSIS — H01.134 ECZEMATOUS DERMATITIS OF UPPER AND LOWER EYELIDS OF BOTH EYES: ICD-10-CM

## 2024-07-09 DIAGNOSIS — D22.9 MULTIPLE BENIGN NEVI: ICD-10-CM

## 2024-07-09 DIAGNOSIS — H01.132 ECZEMATOUS DERMATITIS OF UPPER AND LOWER EYELIDS OF BOTH EYES: ICD-10-CM

## 2024-07-09 DIAGNOSIS — H01.135 ECZEMATOUS DERMATITIS OF UPPER AND LOWER EYELIDS OF BOTH EYES: ICD-10-CM

## 2024-07-09 DIAGNOSIS — D48.5 NEOPLASM OF UNCERTAIN BEHAVIOR OF SKIN: Primary | ICD-10-CM

## 2024-07-09 DIAGNOSIS — L82.1 SEBORRHEIC KERATOSES: ICD-10-CM

## 2024-07-09 DIAGNOSIS — L20.84 INTRINSIC ATOPIC DERMATITIS: ICD-10-CM

## 2024-07-09 DIAGNOSIS — H01.131 ECZEMATOUS DERMATITIS OF UPPER AND LOWER EYELIDS OF BOTH EYES: ICD-10-CM

## 2024-07-09 DIAGNOSIS — Z86.018 HISTORY OF DYSPLASTIC NEVUS: ICD-10-CM

## 2024-07-09 PROCEDURE — 1159F MED LIST DOCD IN RCRD: CPT | Mod: CPTII,S$GLB,, | Performed by: DERMATOLOGY

## 2024-07-09 PROCEDURE — 1160F RVW MEDS BY RX/DR IN RCRD: CPT | Mod: CPTII,S$GLB,, | Performed by: DERMATOLOGY

## 2024-07-09 PROCEDURE — 88305 TISSUE EXAM BY PATHOLOGIST: CPT | Performed by: DERMATOLOGY

## 2024-07-09 PROCEDURE — 99213 OFFICE O/P EST LOW 20 MIN: CPT | Mod: 25,S$GLB,, | Performed by: DERMATOLOGY

## 2024-07-09 PROCEDURE — 3008F BODY MASS INDEX DOCD: CPT | Mod: CPTII,S$GLB,, | Performed by: DERMATOLOGY

## 2024-07-09 PROCEDURE — 11102 TANGNTL BX SKIN SINGLE LES: CPT | Mod: S$GLB,,, | Performed by: DERMATOLOGY

## 2024-07-09 RX ORDER — TRIAMCINOLONE ACETONIDE 1 MG/G
CREAM TOPICAL
Qty: 80 G | Refills: 3 | Status: SHIPPED | OUTPATIENT
Start: 2024-07-09

## 2024-07-09 NOTE — PROGRESS NOTES
Subjective:      Patient ID:  Hayley Epstein is a 38 y.o. female who presents for   Chief Complaint   Patient presents with    Skin Check     TBSE    Rash     Hands      LOV: 2/8/24 Eczematous upper and lower eye lid, intrinsic atopic dermatitis     Patient here for TBSE    C/o rash on hands  Did get nodules on knuckles  Has been clearing up   Painful and  slightly itchy     MD pulm     Has no hx of NS  Has no fhx of MM    Current Outpatient Medications:   ·  albuterol (PROVENTIL/VENTOLIN HFA) 90 mcg/actuation inhaler, Inhale 2 puffs into the lungs every 4 (four) hours as needed for Wheezing or Shortness of Breath (cough). 2 puffs every 4 hours as needed for cough, wheeze, or shortness of breath, Disp: 18 g, Rfl: 11  ·  albuterol-budesonide (AIRSUPRA) 90-80 mcg/actuation, Inhale 2 puffs into the lungs every 4 (four) hours as needed (shortness of breath)., Disp: 10.7 g, Rfl: 11  ·  EScitalopram oxalate (LEXAPRO) 10 MG tablet, Take 1 tablet (10 mg total) by mouth once daily., Disp: 90 tablet, Rfl: 3  ·  fluticasone propionate (FLONASE) 50 mcg/actuation nasal spray, 2 sprays by Each Nostril route once daily., Disp: , Rfl:   ·  fluticasone-umeclidin-vilanter (TRELEGY ELLIPTA) 200-62.5-25 mcg inhaler, Inhale 1 puff into the lungs once daily., Disp: 60 each, Rfl: 11  ·  loratadine (CLARITIN) 10 mg tablet, Take 10 mg by mouth once daily., Disp: , Rfl:   ·  MAGNESIUM ORAL, Take 1 tablet by mouth nightly., Disp: , Rfl:   ·  montelukast (SINGULAIR) 10 mg tablet, TAKE 1 TABLET BY MOUTH EVERY DAY AT NIGHT, Disp: 90 tablet, Rfl: 3  ·  predniSONE (DELTASONE) 20 MG tablet, Take 2 daily for 3, then one daily for 3, repeat for bronchitis, Disp: 27 tablet, Rfl: 1  ·  prenatal vit,meet 74/iron/folic (PRENATAL VITAMIN 1+1 ORAL), Prenatal Vitamin, Disp: , Rfl:   ·  psyllium seed, with sugar, (FIBER ORAL), Take by mouth Daily., Disp: , Rfl:   ·  tacrolimus (PROTOPIC) 0.1 % ointment, Apply to affected areas of face BID prn  eczema/rash., Disp: 30 g, Rfl: 2  ·  tobramycin-dexAMETHasone 0.3-0.1% (TOBRADEX) 0.3-0.1 % Oint, Apply to pruritic/rash on eyelid 3 times a day, Disp: 3.5 g, Rfl: 0  ·  budesonide-formoterol 160-4.5 mcg (SYMBICORT) 160-4.5 mcg/actuation HFAA, Inhale 2 puffs into the lungs as needed., Disp: , Rfl:   ·  ibuprofen (ADVIL,MOTRIN) 800 MG tablet, Take 1 tablet (800 mg total) by mouth every 8 (eight) hours as needed (cramping). (Patient not taking: Reported on 6/7/2024), Disp: 30 tablet, Rfl: 0  ·  levoFLOXacin (LEVAQUIN) 750 MG tablet, Take 1 tablet (750 mg total) by mouth once daily., Disp: 5 tablet, Rfl: 2  ·  metoclopramide HCl (REGLAN) 10 MG tablet, TAKE 1 TABLET BY MOUTH FOUR TIMES A DAY AS NEEDED (Patient not taking: Reported on 6/7/2024), Disp: , Rfl:   ·  omeprazole (PRILOSEC) 40 MG capsule, Take 40 mg by mouth once daily. (Patient not taking: Reported on 6/7/2024), Disp: , Rfl:   ·  oxyCODONE-acetaminophen (PERCOCET) 5-325 mg per tablet, Take 1 tablet by mouth every 4 (four) hours as needed for Pain. (Patient not taking: Reported on 6/7/2024), Disp: 10 tablet, Rfl: 0  ·  promethazine (PHENERGAN) 12.5 MG Tab, TAKE 1 TABLET BY MOUTH FOUR TIMES A DAY AS NEEDED (Patient not taking: Reported on 6/7/2024), Disp: , Rfl:           Review of Systems   Constitutional:  Negative for fever, chills and fatigue.   Skin:  Positive for itching, rash, dry skin, daily sunscreen use and activity-related sunscreen use. Negative for wears hat.   Hematologic/Lymphatic: Does not bruise/bleed easily.       Objective:   Physical Exam   Constitutional: She appears well-developed and well-nourished. No distress.   Neurological: She is alert and oriented to person, place, and time. She is not disoriented.   Psychiatric: She has a normal mood and affect.   Skin:   Areas Examined (abnormalities noted in diagram):   Head / Face Inspection Performed                     Diagram Legend     Erythematous scaling macule/papule c/w actinic keratosis        Vascular papule c/w angioma      Pigmented verrucoid papule/plaque c/w seborrheic keratosis      Yellow umbilicated papule c/w sebaceous hyperplasia      Irregularly shaped tan macule c/w lentigo     1-2 mm smooth white papules consistent with Milia      Movable subcutaneous cyst with punctum c/w epidermal inclusion cyst      Subcutaneous movable cyst c/w pilar cyst      Firm pink to brown papule c/w dermatofibroma      Pedunculated fleshy papule(s) c/w skin tag(s)      Evenly pigmented macule c/w junctional nevus     Mildly variegated pigmented, slightly irregular-bordered macule c/w mildly atypical nevus      Flesh colored to evenly pigmented papule c/w intradermal nevus       Pink pearly papule/plaque c/w basal cell carcinoma      Erythematous hyperkeratotic cursted plaque c/w SCC      Surgical scar with no sign of skin cancer recurrence      Open and closed comedones      Inflammatory papules and pustules      Verrucoid papule consistent consistent with wart     Erythematous eczematous patches and plaques     Dystrophic onycholytic nail with subungual debris c/w onychomycosis     Umbilicated papule    Erythematous-base heme-crusted tan verrucoid plaque consistent with inflamed seborrheic keratosis     Erythematous Silvery Scaling Plaque c/w Psoriasis     See annotation      Assessment / Plan:      Pathology Orders:       Normal Orders This Visit    Specimen to Pathology, Dermatology     Comments:    Number of Specimens:->1  ------------------------->-------------------------  Spec 1 Procedure:->Biopsy  Spec 1 Clinical Impression:->irregularly pigmented macule,  changing, r/o DN  Spec 1 Source:->R abdomen    Questions:    Procedure Type: Dermatology and skin neoplasms    Number of Specimens: 1    ------------------------: -------------------------    Spec 1 Procedure: Biopsy    Spec 1 Clinical Impression: irregularly pigmented macule, changing, r/o DN    Spec 1 Source: R abdomen    Release to patient:            Neoplasm of uncertain behavior of skin  -     Specimen to Pathology, Dermatology  Shave biopsy procedure note:    Shave biopsy performed after verbal consent including risk of infection, scar, recurrence, need for additional treatment of site. Area prepped with alcohol, anesthetized with approximately 1.0cc of 1% lidocaine with epinephrine. Lesional tissue shaved with razor blade. Hemostasis achieved with application of aluminum chloride followed by hyfrecation. No complications. Dressing applied. Wound care explained.    3 mo post partum, changes noted in this lesion.    Atopic dermatitis  -     triamcinolone acetonide 0.1% (KENALOG) 0.1 % cream; AAA bid  Dispense: 80 g; Refill: 3  Recent nodular flare on hands, no longer present  Nail fold capillaries unremarkable  No sun exposed rash  Message us for biopsy if recurs    Multiple benign nevi  Careful dermoscopy evaluation of nevi performed with none identified as needing biopsy today  Monitor for new mole or moles that are becoming bigger, darker, irritated, or developing irregular borders.     History of dysplastic nevus  Area of previous DN (abdomen) examined. Site well healed with no signs of recurrence.    Total body skin examination performed today including at least 12 points as noted in physical examination. 1 lesions suspicious for malignancy noted.    Seborrheic keratoses  These are benign inherited growths without a malignant potential. Reassurance given to patient. No treatment is necessary.     Patient instructed in importance in daily broad spectrum sun protection of at least spf 30. Mineral sunscreen ingredients preferred (Zinc +/- Titanium) and can be found OTC.   Recommend Elta MD for daily use on face and neck.  Patient encouraged to wear hat for all outdoor exposure.   Also discussed sun avoidance and use of protective clothing.             Follow up if symptoms worsen or fail to improve.

## 2024-07-09 NOTE — PATIENT INSTRUCTIONS
Shave Biopsy Wound Care    Your doctor has performed a shave biopsy today.  A band aid and vaseline ointment has been placed over the site.  This should remain in place for 24 hours.  It is recommended that you keep the area dry for the first 24 hours.  After 24 hours, you may remove the band aid and wash the area with warm soap and water and apply Vaseline jelly.  Many patients prefer to use Neosporin or Bacitracin ointment.  This is acceptable; however, know that you can develop an allergy to this medication even if you have used it safely for years.  It is important to keep the area moist.  Letting it dry out and get air slows healing time, and will worsen the scar.  Band aid is optional after first 24 hours.      If you notice increasing redness, tenderness, pain, or yellow drainage at the biopsy site, please notify your doctor.  These are signs of an infection.    If your biopsy site is bleeding, apply firm pressure for 15 minutes straight.  Repeat for another 15 minutes, if it is still bleeding.   If the surgical site continues to bleed, then please contact your doctor.       [facility] - DERMATOLOGY  [address], [address]  [place] [address]  Dept: [phone]  Dept Fax: [phone]

## 2024-07-11 LAB
FINAL PATHOLOGIC DIAGNOSIS: NORMAL
GROSS: NORMAL
Lab: NORMAL
MICROSCOPIC EXAM: NORMAL

## 2024-07-15 PROBLEM — O42.92 FULL-TERM PREM ROM, UNSP TIME BETW RUPTURE AND ONSET LABOR: Status: RESOLVED | Noted: 2024-04-11 | Resolved: 2024-07-15

## 2024-07-18 ENCOUNTER — OFFICE VISIT (OUTPATIENT)
Dept: OPTOMETRY | Facility: CLINIC | Age: 39
End: 2024-07-18
Payer: COMMERCIAL

## 2024-07-18 DIAGNOSIS — H43.393 VITREOUS FLOATERS OF BOTH EYES: Primary | ICD-10-CM

## 2024-07-18 DIAGNOSIS — H52.13 MYOPIA WITH ASTIGMATISM, BILATERAL: ICD-10-CM

## 2024-07-18 DIAGNOSIS — H52.203 MYOPIA WITH ASTIGMATISM, BILATERAL: ICD-10-CM

## 2024-07-18 PROCEDURE — 99999 PR PBB SHADOW E&M-EST. PATIENT-LVL III: CPT | Mod: PBBFAC,,,

## 2024-07-18 PROCEDURE — 92004 COMPRE OPH EXAM NEW PT 1/>: CPT | Mod: S$GLB,,,

## 2024-07-18 PROCEDURE — 92015 DETERMINE REFRACTIVE STATE: CPT | Mod: S$GLB,,,

## 2024-07-18 PROCEDURE — 1159F MED LIST DOCD IN RCRD: CPT | Mod: CPTII,S$GLB,,

## 2024-07-18 NOTE — PROGRESS NOTES
HPI    Pt here for eye exam-dle-2 years    Pt complains of blurred dva. Needing updated glasses rx. Complains of new   floaters OS x 2 months. No flashes. Complains of frequent migraines and   some vertigo-once a week. No contacts. No AT prn.   Last edited by Rikki Andrade, OD on 7/18/2024 12:58 PM.            Assessment /Plan     For exam results, see Encounter Report.    Vitreous floaters of both eyes    Myopia with astigmatism, bilateral      Pt noticing floaters OS>OD x 2 months. No holes, tears, or retinal detachments 360 on fundus exam + Optos OD, OS. Ed pt on benign nature of vitreous floaters. Reviewed signs and symptoms of a retinal detachment thoroughly and ed pt to RTC asap if experienced.  Discussed spectacle options with pt and released final spec rx. Ed pt on change in rx and adaptation.    RTC: 1 year for comprehensive exam or sooner prn

## 2024-08-18 ENCOUNTER — OFFICE VISIT (OUTPATIENT)
Dept: URGENT CARE | Facility: CLINIC | Age: 39
End: 2024-08-18
Payer: COMMERCIAL

## 2024-08-18 VITALS
SYSTOLIC BLOOD PRESSURE: 116 MMHG | RESPIRATION RATE: 18 BRPM | DIASTOLIC BLOOD PRESSURE: 79 MMHG | OXYGEN SATURATION: 98 % | HEART RATE: 70 BPM | WEIGHT: 130 LBS | HEIGHT: 66 IN | BODY MASS INDEX: 20.89 KG/M2 | TEMPERATURE: 98 F

## 2024-08-18 DIAGNOSIS — R10.11 RIGHT UPPER QUADRANT PAIN: Primary | ICD-10-CM

## 2024-08-18 DIAGNOSIS — D13.4 BENIGN NEOPLASM OF LIVER: ICD-10-CM

## 2024-08-18 DIAGNOSIS — R16.0 HEPATOMEGALY: ICD-10-CM

## 2024-08-18 PROCEDURE — 99204 OFFICE O/P NEW MOD 45 MIN: CPT | Mod: S$GLB,,, | Performed by: PHYSICIAN ASSISTANT

## 2024-08-18 RX ORDER — ALUMINUM HYDROXIDE, MAGNESIUM HYDROXIDE, AND SIMETHICONE 1200; 120; 1200 MG/30ML; MG/30ML; MG/30ML
15 SUSPENSION ORAL
Status: COMPLETED | OUTPATIENT
Start: 2024-08-18 | End: 2024-08-18

## 2024-08-18 RX ORDER — LIDOCAINE HYDROCHLORIDE 20 MG/ML
5 SOLUTION OROPHARYNGEAL
Status: COMPLETED | OUTPATIENT
Start: 2024-08-18 | End: 2024-08-18

## 2024-08-18 RX ADMIN — ALUMINUM HYDROXIDE, MAGNESIUM HYDROXIDE, AND SIMETHICONE 15 ML: 1200; 120; 1200 SUSPENSION ORAL at 04:08

## 2024-08-18 RX ADMIN — LIDOCAINE HYDROCHLORIDE 5 ML: 20 SOLUTION OROPHARYNGEAL at 04:08

## 2024-08-18 NOTE — PATIENT INSTRUCTIONS

## 2024-08-18 NOTE — PROGRESS NOTES
"Subjective:      Patient ID: Hayley Epstein is a 38 y.o. female.    Vitals:  height is 5' 6" (1.676 m) and weight is 59 kg (130 lb). Her oral temperature is 98.3 °F (36.8 °C). Her blood pressure is 116/79 and her pulse is 70. Her respiration is 18 and oxygen saturation is 98%.     Chief Complaint: Abdominal Pain    Pt presents today with c/o abdominal pain since today. Pt has taken otc meds for symptoms with no relief. Pt states that the pain is by the liver, and that she does have a huge liver and a tumor on the liver. Pain level 7/10.    Abdominal Pain  This is a new problem. The current episode started today. The onset quality is sudden. The problem occurs constantly. The problem has been unchanged. The pain is located in the RUQ. The pain is at a severity of 7/10. The pain is moderate. The quality of the pain is dull and aching. The abdominal pain does not radiate. Associated symptoms include headaches and nausea. Pertinent negatives include no anorexia, arthralgias, belching, constipation, diarrhea, dysuria, fever, flatus, frequency, hematochezia, hematuria, melena, myalgias, vomiting or weight loss. Nothing aggravates the pain. The pain is relieved by Nothing. She has tried antacids and acetaminophen for the symptoms. The treatment provided no relief. Her past medical history is significant for GERD. There is no history of abdominal surgery, colon cancer, Crohn's disease, gallstones, irritable bowel syndrome, pancreatitis, PUD or ulcerative colitis. Patient's medical history does not include kidney stones and UTI.       Constitution: Negative for chills, sweating, fatigue and fever.   HENT:  Negative for ear pain, drooling, congestion, sore throat, trouble swallowing and voice change.    Neck: Negative for neck pain, neck stiffness, painful lymph nodes and neck swelling.   Cardiovascular:  Negative for chest pain, leg swelling, palpitations, sob on exertion and passing out.   Eyes:  Negative for eye pain, " eye redness, photophobia, double vision, blurred vision and eyelid swelling.   Respiratory:  Negative for chest tightness, cough, sputum production, bloody sputum, shortness of breath, stridor and wheezing.    Gastrointestinal:  Positive for abdominal pain and nausea. Negative for abdominal bloating, history of abdominal surgery, vomiting, constipation, diarrhea, bright red blood in stool and heartburn.   Genitourinary:  Negative for dysuria, frequency and hematuria.   Musculoskeletal:  Negative for joint pain, joint swelling, abnormal ROM of joint, back pain, muscle cramps and muscle ache.   Skin:  Negative for rash and hives.   Allergic/Immunologic: Negative for seasonal allergies, food allergies, hives, itching and sneezing.   Neurological:  Positive for headaches. Negative for dizziness, light-headedness, passing out, loss of balance, altered mental status, loss of consciousness and seizures.   Hematologic/Lymphatic: Negative for swollen lymph nodes.   Psychiatric/Behavioral:  Negative for altered mental status and nervous/anxious. The patient is not nervous/anxious.       Objective:     Physical Exam   Constitutional: She is oriented to person, place, and time. She appears well-developed. She is cooperative.   HENT:   Head: Normocephalic and atraumatic.   Ears:   Right Ear: Hearing, tympanic membrane, external ear and ear canal normal.   Left Ear: Hearing, tympanic membrane, external ear and ear canal normal.   Nose: Nose normal. No mucosal edema or nasal deformity. No epistaxis. Right sinus exhibits no maxillary sinus tenderness and no frontal sinus tenderness. Left sinus exhibits no maxillary sinus tenderness and no frontal sinus tenderness.   Mouth/Throat: Uvula is midline, oropharynx is clear and moist and mucous membranes are normal. No trismus in the jaw. Normal dentition. No uvula swelling.   Eyes: Conjunctivae and lids are normal.   Neck: Trachea normal and phonation normal. Neck supple.    Cardiovascular: Normal rate, regular rhythm, normal heart sounds and normal pulses.   Pulmonary/Chest: Effort normal and breath sounds normal.   Abdominal: Normal appearance and bowel sounds are normal. She exhibits no shifting dullness and no distension. Soft. flat abdomen There is abdominal tenderness in the right upper quadrant and epigastric area.   Musculoskeletal: Normal range of motion.         General: Normal range of motion.   Neurological: She is alert and oriented to person, place, and time. She exhibits normal muscle tone.   Skin: Skin is warm, dry and intact.   Psychiatric: Her speech is normal and behavior is normal. Judgment and thought content normal.   Nursing note and vitals reviewed.      Assessment:     1. Right upper quadrant pain    2. Hepatomegaly    3. Benign neoplasm of liver        Plan:       Right upper quadrant pain  -     US Abdomen Complete; Future; Expected date: 08/18/2024  -     CBC W/ AUTO DIFFERENTIAL; Future; Expected date: 08/18/2024  -     COMPREHENSIVE METABOLIC PANEL; Future; Expected date: 08/18/2024  -     AMYLASE; Future; Expected date: 08/18/2024  -     LIPASE; Future; Expected date: 08/18/2024    Hepatomegaly    Benign neoplasm of liver    Other orders  -     aluminum-magnesium hydroxide-simethicone 200-200-20 mg/5 mL suspension 15 mL  -     LIDOcaine viscous HCl 2% oral solution 5 mL      Patient Instructions   INSTRUCTIONS:  - Rest.  - Drink plenty of fluids.  - Take Tylenol and/or Ibuprofen as directed as needed for fever/pain.  Do not take more than the recommended dose.  - follow up with your PCP within the next 1-2 weeks as needed.  - You must understand that you have received an Urgent Care treatment only and that you may be released before all of your medical problems are known or treated.   - You, the patient, will arrange for follow up care as instructed.   - If your condition worsens or fails to improve we recommend that you receive another evaluation at the  ER immediately or contact your PCP to discuss your concerns.   - You can call [phone] or [phone] to help schedule an appointment with the appropriate provider.     -If you smoke cigarettes, it would be beneficial for you to stop.

## 2024-08-19 ENCOUNTER — HOSPITAL ENCOUNTER (OUTPATIENT)
Dept: RADIOLOGY | Facility: HOSPITAL | Age: 39
Discharge: HOME | End: 2024-08-19
Attending: PHYSICIAN ASSISTANT
Payer: COMMERCIAL

## 2024-08-19 ENCOUNTER — LAB VISIT (OUTPATIENT)
Dept: LAB | Facility: HOSPITAL | Age: 39
End: 2024-08-19
Attending: PHYSICIAN ASSISTANT
Payer: COMMERCIAL

## 2024-08-19 DIAGNOSIS — R10.11 RIGHT UPPER QUADRANT PAIN: ICD-10-CM

## 2024-08-19 LAB
ALBUMIN SERPL BCP-MCNC: 4.4 G/DL (ref 3.5–5.2)
ALP SERPL-CCNC: 78 U/L (ref 55–135)
ALT SERPL W/O P-5'-P-CCNC: 22 U/L (ref 10–44)
AMYLASE SERPL-CCNC: 57 U/L (ref 20–110)
ANION GAP SERPL CALC-SCNC: 12 MMOL/L (ref 8–16)
AST SERPL-CCNC: 24 U/L (ref 10–40)
BASOPHILS # BLD AUTO: 0.04 K/UL (ref 0–0.2)
BASOPHILS NFR BLD: 0.6 % (ref 0–1.9)
BILIRUB SERPL-MCNC: 0.5 MG/DL (ref 0.1–1)
BUN SERPL-MCNC: 13 MG/DL (ref 6–20)
CALCIUM SERPL-MCNC: 9.7 MG/DL (ref 8.7–10.5)
CHLORIDE SERPL-SCNC: 104 MMOL/L (ref 95–110)
CO2 SERPL-SCNC: 25 MMOL/L (ref 23–29)
CREAT SERPL-MCNC: 1 MG/DL (ref 0.5–1.4)
DIFFERENTIAL METHOD BLD: NORMAL
EOSINOPHIL # BLD AUTO: 0.1 K/UL (ref 0–0.5)
EOSINOPHIL NFR BLD: 1.4 % (ref 0–8)
ERYTHROCYTE [DISTWIDTH] IN BLOOD BY AUTOMATED COUNT: 12.9 % (ref 11.5–14.5)
EST. GFR  (NO RACE VARIABLE): >60 ML/MIN/1.73 M^2
GLUCOSE SERPL-MCNC: 77 MG/DL (ref 70–110)
HCT VFR BLD AUTO: 40.8 % (ref 37–48.5)
HGB BLD-MCNC: 13.6 G/DL (ref 12–16)
IMM GRANULOCYTES # BLD AUTO: 0.02 K/UL (ref 0–0.04)
IMM GRANULOCYTES NFR BLD AUTO: 0.3 % (ref 0–0.5)
LIPASE SERPL-CCNC: 65 U/L (ref 4–60)
LYMPHOCYTES # BLD AUTO: 2.1 K/UL (ref 1–4.8)
LYMPHOCYTES NFR BLD: 33.2 % (ref 18–48)
MCH RBC QN AUTO: 30.6 PG (ref 27–31)
MCHC RBC AUTO-ENTMCNC: 33.3 G/DL (ref 32–36)
MCV RBC AUTO: 92 FL (ref 82–98)
MONOCYTES # BLD AUTO: 0.4 K/UL (ref 0.3–1)
MONOCYTES NFR BLD: 6.7 % (ref 4–15)
NEUTROPHILS # BLD AUTO: 3.6 K/UL (ref 1.8–7.7)
NEUTROPHILS NFR BLD: 57.8 % (ref 38–73)
NRBC BLD-RTO: 0 /100 WBC
PLATELET # BLD AUTO: 304 K/UL (ref 150–450)
PMV BLD AUTO: 9.4 FL (ref 9.2–12.9)
POTASSIUM SERPL-SCNC: 3.9 MMOL/L (ref 3.5–5.1)
PROT SERPL-MCNC: 7.4 G/DL (ref 6–8.4)
RBC # BLD AUTO: 4.44 M/UL (ref 4–5.4)
SODIUM SERPL-SCNC: 141 MMOL/L (ref 136–145)
WBC # BLD AUTO: 6.23 K/UL (ref 3.9–12.7)

## 2024-08-19 PROCEDURE — 80053 COMPREHEN METABOLIC PANEL: CPT | Performed by: PHYSICIAN ASSISTANT

## 2024-08-19 PROCEDURE — 76700 US EXAM ABDOM COMPLETE: CPT | Mod: TC,PO

## 2024-08-19 PROCEDURE — 76700 US EXAM ABDOM COMPLETE: CPT | Mod: 26,,, | Performed by: RADIOLOGY

## 2024-08-19 PROCEDURE — 36415 COLL VENOUS BLD VENIPUNCTURE: CPT | Performed by: PHYSICIAN ASSISTANT

## 2024-08-19 PROCEDURE — 82150 ASSAY OF AMYLASE: CPT | Performed by: PHYSICIAN ASSISTANT

## 2024-08-19 PROCEDURE — 85025 COMPLETE CBC W/AUTO DIFF WBC: CPT | Performed by: PHYSICIAN ASSISTANT

## 2024-08-19 PROCEDURE — 83690 ASSAY OF LIPASE: CPT | Performed by: PHYSICIAN ASSISTANT

## 2024-08-20 ENCOUNTER — TELEPHONE (OUTPATIENT)
Dept: URGENT CARE | Facility: CLINIC | Age: 39
End: 2024-08-20
Payer: COMMERCIAL

## 2024-08-20 NOTE — TELEPHONE ENCOUNTER
Spoke with patient about US. Pt follows hepatology. Continue to follow up with pcp. Pt states she felt better after GI cocktail. Pt has started ppi and pepcid.

## 2024-09-04 ENCOUNTER — HOSPITAL ENCOUNTER (OUTPATIENT)
Dept: PULMONOLOGY | Facility: HOSPITAL | Age: 39
Discharge: HOME | End: 2024-09-04
Attending: INTERNAL MEDICINE
Payer: COMMERCIAL

## 2024-09-04 DIAGNOSIS — J47.1 BRONCHIECTASIS WITH ACUTE EXACERBATION: ICD-10-CM

## 2024-09-04 DIAGNOSIS — J45.21 MILD INTERMITTENT ASTHMA WITH ACUTE EXACERBATION: ICD-10-CM

## 2024-09-04 LAB
ERV LLN: -16448.85
ERV PRE REF: 109.9 %
ERV REF: 1.15
FEF 25 75 CHG: 3.3 %
FEF 25 75 LLN: 2.33
FEF 25 75 POST REF: 90.7 %
FEF 25 75 PRE REF: 87.8 %
FEF 25 75 REF: 3.72
FET100 CHG: -2 %
FEV1 CHG: 0.2 %
FEV1 FVC CHG: 0.8 %
FEV1 FVC LLN: 71
FEV1 FVC POST REF: 101.5 %
FEV1 FVC PRE REF: 100.7 %
FEV1 FVC REF: 82
FEV1 LLN: 2.59
FEV1 POST REF: 91.7 %
FEV1 PRE REF: 91.6 %
FEV1 REF: 3.25
FRCPLETH LLN: 1.97
FRCPLETH PREREF: 58.8 %
FRCPLETH REF: 2.79
FVC CHG: -0.6 %
FVC LLN: 3.17
FVC POST REF: 89.8 %
FVC PRE REF: 90.4 %
FVC REF: 3.97
PEF CHG: 5.6 %
PEF LLN: 5.48
PEF POST REF: 116 %
PEF PRE REF: 109.8 %
PEF REF: 7.3
POST FEF 25 75: 3.38 L/S (ref 2.33–5.12)
POST FET 100: 7.45 SEC
POST FEV1 FVC: 83.61 % (ref 71.32–91.4)
POST FEV1: 2.98 L (ref 2.59–3.89)
POST FVC: 3.57 L (ref 3.17–4.8)
POST PEF: 8.47 L/S (ref 5.48–9.12)
PRE ERV: 1.26 L (ref -16448.85–16451.15)
PRE FEF 25 75: 3.27 L/S (ref 2.33–5.12)
PRE FET 100: 7.6 SEC
PRE FEV1 FVC: 82.97 % (ref 71.32–91.4)
PRE FEV1: 2.98 L (ref 2.59–3.89)
PRE FRC PL: 1.64 L (ref 1.97–3.62)
PRE FVC: 3.59 L (ref 3.17–4.8)
PRE PEF: 8.02 L/S (ref 5.48–9.12)
PRE RV: 0.38 L (ref 1.07–2.22)
PRE TLC: 3.97 L (ref 4.29–6.26)
RAW LLN: 3.06
RAW PRE REF: 27.7 %
RAW PRE: 0.85 CMH2O*S/L (ref 3.06–3.06)
RAW REF: 3.06
RV LLN: 1.07
RV PRE REF: 23.1 %
RV REF: 1.64
RVTLC LLN: 22
RVTLC PRE REF: 30 %
RVTLC PRE: 9.55 % (ref 22.29–41.47)
RVTLC REF: 32
TLC LLN: 4.29
TLC PRE REF: 75.2 %
TLC REF: 5.27
VC LLN: 3.17
VC PRE REF: 90.4 %
VC PRE: 3.59 L (ref 3.17–4.8)
VC REF: 3.97

## 2024-09-04 PROCEDURE — 94060 EVALUATION OF WHEEZING: CPT | Performed by: INTERNAL MEDICINE

## 2024-09-04 PROCEDURE — 94726 PLETHYSMOGRAPHY LUNG VOLUMES: CPT

## 2024-09-04 RX ORDER — ALBUTEROL SULFATE 2.5 MG/.5ML
SOLUTION RESPIRATORY (INHALATION)
Status: DISPENSED
Start: 2024-09-04 | End: 2024-09-04

## 2024-09-19 ENCOUNTER — TELEPHONE (OUTPATIENT)
Dept: HEPATOLOGY | Facility: CLINIC | Age: 39
End: 2024-09-19
Payer: COMMERCIAL

## 2024-09-19 ENCOUNTER — OFFICE VISIT (OUTPATIENT)
Dept: HEPATOLOGY | Facility: CLINIC | Age: 39
End: 2024-09-19
Payer: COMMERCIAL

## 2024-09-19 VITALS
DIASTOLIC BLOOD PRESSURE: 74 MMHG | HEIGHT: 66 IN | OXYGEN SATURATION: 99 % | HEART RATE: 82 BPM | BODY MASS INDEX: 20.55 KG/M2 | SYSTOLIC BLOOD PRESSURE: 111 MMHG | WEIGHT: 127.88 LBS

## 2024-09-19 DIAGNOSIS — D13.4 ADENOMA OF LIVER: Primary | ICD-10-CM

## 2024-09-19 PROCEDURE — 99214 OFFICE O/P EST MOD 30 MIN: CPT | Mod: S$GLB,,, | Performed by: INTERNAL MEDICINE

## 2024-09-19 PROCEDURE — 1159F MED LIST DOCD IN RCRD: CPT | Mod: CPTII,S$GLB,, | Performed by: INTERNAL MEDICINE

## 2024-09-19 PROCEDURE — 3074F SYST BP LT 130 MM HG: CPT | Mod: CPTII,S$GLB,, | Performed by: INTERNAL MEDICINE

## 2024-09-19 PROCEDURE — 99999 PR PBB SHADOW E&M-EST. PATIENT-LVL V: CPT | Mod: PBBFAC,,, | Performed by: INTERNAL MEDICINE

## 2024-09-19 PROCEDURE — 3008F BODY MASS INDEX DOCD: CPT | Mod: CPTII,S$GLB,, | Performed by: INTERNAL MEDICINE

## 2024-09-19 PROCEDURE — 3078F DIAST BP <80 MM HG: CPT | Mod: CPTII,S$GLB,, | Performed by: INTERNAL MEDICINE

## 2024-09-19 PROCEDURE — 1160F RVW MEDS BY RX/DR IN RCRD: CPT | Mod: CPTII,S$GLB,, | Performed by: INTERNAL MEDICINE

## 2024-09-19 RX ORDER — COPPER 313.4 MG/1
INTRAUTERINE DEVICE INTRAUTERINE
COMMUNITY

## 2024-09-19 NOTE — PROGRESS NOTES
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 This is her first f/u with me after the initial virtual consultaiton (9/19/24)     CHART REVIEW:   RUQ u/s 2/17 - 1.3 and 2.3 cm  liver lesions (after did US on her own abdomen)-got pregnant  RUQ u/s 4/18 - 3cm and 1.6cm liver lesions (during pregnancy)  MRI 12/18 - 2.8 and 1.5 cm lesion (gadavist) (after pregnancy) told nothing to worry about  Baby #2 2021  Labs 2/22 - Nml CBC / CMP   RUQ u/s 2/22 - 2.4 and 4.3 cm solid lesion suspicious for adenomas; Otherwise neg   Thinking about having another child     Radiology review 4/12/22: liver lesions with arterial enhancement with washout on eovist studies; favor adenoma, slightly larger when compared with chest ct from 2020; larger lesion measures 3.6 cm; repeat imaging every 6 months for 2 years then annually     OCP x 5 years in her 20's  Then reggie iud and now copper iud  Hx Pain in liver- engorged; not currently     AFP 4/8/22: 4.9  Labs 2/23/22: ALT 24, AST 25, Tbil 0.3, ALKP 83     Imaging hx:  CT chest wo contrast 1/17/20: 2.9 cm but no contrast  Abdo US 2/23/22: Liver: Enlarged measuring 22 cm.  There is a Riedel's lobe.  Homogeneous echotexture. In the right lobe are 2 hyperechoic solid masses measuring 4.3 cm and 2.4 cm.  MRI abdo w wo eovist 3/24/22: The liver is enlarged with the right lobe measuring 23 cm in length.  Within segment 2 of the left hepatic lobe, there is a 4.0 cm mass exhibiting heterogeneous moderately hyperintense T2 signal, with pronounced loss of signal on T1 out of phase imaging indicative of internal lipid content.  The mass exhibits heterogeneous arterial phase enhancement, with lack of contrast retention on later  phases; A 2nd mass is present within segment 6 of the right hepatic lobe, measuring 1.9 cm, exhibiting mildly bright T2 hyperintense signal, however with less convincing T1 out of phase signal dropout.  Discontinuous nodular enhancement is demonstrated on arterial phase imaging, with lack of enhancement present on later phase imaging relative to background hepatic parenchyma; Impression: Moderately enlarged liver, demonstrating 2 masses.  The larger, left hepatic lobe mass exhibits features favorable for a hepatic adenoma.  The smaller right hepatic lobe mass may represent an additional adenoma, versus hemangioma.          Interval History  Impression at time of consultation: history of hepatic adenomas with some growth since 2017. No underlying liver disease as evidenced by normal liver enzymes but does have an enlarged liver. Old films are not yeat available for review. Recommend imaging in 6 months -will do abdo US. If becomes pregnant will do abdo US each trimester.. Recommend fibroscan since has hepatomegaly. Will review hepatomegaly with radiologist.     Since Hayley SHAY Lucian's last visit:    --had a third child  --mid aug- ruq pain- went to urgent care- had abdo pain - no exacerbating/alleviating symptoms  --does have GERD- antacids did not help    Abdo US 8/19/24: Liver: There is elongation of the right hepatic lobe which measures 21.5 cm in sagittal dimension on the basis of a Riedel's lobe anatomic variant. The liver shows a normal homogeneous parenchymal echotexture. There is a 44 x 31 x 42 mm homogeneously hyperechoic mass with internal color flow present within the lateral segment of the left hepatic lobe, larger in size than at the time of the previous study. There is a 23 x 14 x 24 mm subcapsular mass present within the anterior inferior right hepatic lobe, most like in segment VI, similar when compared to the prior study. The portal vein is patent and shows normal directional flow.The IVC is patent  where visualized. Biliary system: The gallbladder shows no evidence of shadowing stones, distension, pericholecystic fluid, or sonographic Real sign.The common bile duct is not dilated, measuring 4 mm. No intrahepatic biliary ductal dilatation. Impression: Interval enlargement of 44 x 31 x 42 mm homogeneously hyperechoic left hepatic lobe mass with internal color flow, previously characterized as a hepatic adenoma by MRI; 23 x 14 x 24 mm homogeneously hyperechoic mass within segment VI of the liver, previously characterized by MRI as likely representative of an adenoma or hemangioma (stable); No new hepatic masses.    Labs 8/19/24: ALT 22, AST 24, ALKP 78, Tbil 0.5    Outpatient Encounter Medications as of 9/19/2024   Medication Sig Dispense Refill    albuterol (PROVENTIL/VENTOLIN HFA) 90 mcg/actuation inhaler Inhale 2 puffs into the lungs every 4 (four) hours as needed for Wheezing or Shortness of Breath (cough). 2 puffs every 4 hours as needed for cough, wheeze, or shortness of breath 18 g 11    albuterol-budesonide (AIRSUPRA) 90-80 mcg/actuation Inhale 2 puffs into the lungs every 4 (four) hours as needed (shortness of breath). 10.7 g 11    EScitalopram oxalate (LEXAPRO) 10 MG tablet Take 1 tablet (10 mg total) by mouth once daily. 90 tablet 3    fluticasone propionate (FLONASE) 50 mcg/actuation nasal spray 2 sprays by Each Nostril route once daily.      fluticasone-umeclidin-vilanter (TRELEGY ELLIPTA) 200-62.5-25 mcg inhaler Inhale 1 puff into the lungs once daily. 60 each 11    loratadine (CLARITIN) 10 mg tablet Take 10 mg by mouth once daily.      MAGNESIUM ORAL Take 1 tablet by mouth nightly.      montelukast (SINGULAIR) 10 mg tablet TAKE 1 TABLET BY MOUTH EVERY DAY AT NIGHT 90 tablet 3    PARAGARD T 380A 380 square mm IUD       predniSONE (DELTASONE) 20 MG tablet Take 2 daily for 3, then one daily for 3, repeat for bronchitis 27 tablet 1    prenatal vit,meet 74/iron/folic (PRENATAL VITAMIN 1+1 ORAL)  Prenatal Vitamin      psyllium seed, with sugar, (FIBER ORAL) Take by mouth Daily.      tobramycin-dexAMETHasone 0.3-0.1% (TOBRADEX) 0.3-0.1 % Oint Apply to pruritic/rash on eyelid 3 times a day 3.5 g 0    triamcinolone acetonide 0.1% (KENALOG) 0.1 % cream AAA bid 80 g 3    [DISCONTINUED] tacrolimus (PROTOPIC) 0.1 % ointment Apply to affected areas of face BID prn eczema/rash. 30 g 2     No facility-administered encounter medications on file as of 9/19/2024.     Review of patient's allergies indicates:   Allergen Reactions    Penicillins Rash    Sulfa (sulfonamide antibiotics) Hives     Past Medical History:   Diagnosis Date    Adenoma of liver     Asthma     since covid    Asthma 05/01/2022    COVID-19 virus detected 03/2020    Post partum depression 05/01/2022    Postpartum depression     post-partum, on lexapro       Review of Systems   Constitutional: Negative.    HENT: Negative.     Eyes: Negative.    Respiratory: Negative.     Cardiovascular: Negative.    Gastrointestinal: Negative.    Genitourinary: Negative.    Musculoskeletal: Negative.    Skin: Negative.    Neurological: Negative.    Psychiatric/Behavioral: Negative.       Vitals:    09/19/24 1059   BP: 111/74   Pulse: 82       Physical Exam  Vitals reviewed.   Eyes:      General: No scleral icterus.  Cardiovascular:      Rate and Rhythm: Normal rate and regular rhythm.   Pulmonary:      Effort: Pulmonary effort is normal.   Abdominal:      General: Abdomen is flat. There is no distension.      Palpations: Abdomen is soft. There is no mass.      Tenderness: There is abdominal tenderness (RUQ).   Musculoskeletal:         General: Normal range of motion.   Skin:     General: Skin is warm and dry.   Neurological:      General: No focal deficit present.      Mental Status: She is alert and oriented to person, place, and time.   Psychiatric:         Mood and Affect: Mood normal.         Computed MELD 3.0 unavailable. One or more values for this score either  were not found within the given timeframe or did not fit some other criterion.  Computed MELD-Na unavailable. One or more values for this score either were not found within the given timeframe or did not fit some other criterion.      Lab Results   Component Value Date    GLU 77 08/19/2024    BUN 13 08/19/2024    CREATININE 1.0 08/19/2024    CALCIUM 9.7 08/19/2024     08/19/2024    K 3.9 08/19/2024     08/19/2024    PROT 7.4 08/19/2024    CO2 25 08/19/2024    ANIONGAP 12 08/19/2024    WBC 6.23 08/19/2024    RBC 4.44 08/19/2024    HGB 13.6 08/19/2024    HCT 40.8 08/19/2024    MCV 92 08/19/2024    MCH 30.6 08/19/2024    MCHC 33.3 08/19/2024     Lab Results   Component Value Date    RDW 12.9 08/19/2024     08/19/2024    MPV 9.4 08/19/2024    GRAN 3.6 08/19/2024    GRAN 57.8 08/19/2024    LYMPH 2.1 08/19/2024    LYMPH 33.2 08/19/2024    MONO 0.4 08/19/2024    MONO 6.7 08/19/2024    EOSINOPHIL 1.4 08/19/2024    BASOPHIL 0.6 08/19/2024    EOS 0.1 08/19/2024    BASO 0.04 08/19/2024    GROUPTRH A POS 04/09/2024    CHOL 160 10/30/2019    TRIG 59 10/30/2019    HDL 47 10/30/2019    CHOLHDL 29.4 10/30/2019    TOTALCHOLEST 3.4 10/30/2019    ALBUMIN 4.4 08/19/2024    BILIDIR 0.2 12/01/2023    AST 24 08/19/2024    ALT 22 08/19/2024    ALKPHOS 78 08/19/2024    LABPROT 10.5 02/23/2022    INR 1.0 02/23/2022       Assessment and Plan:  Hayley Epstein is a 38 y.o. female with hepatic adenomas. The larger one has grown. She has abdominal discomfort. It is getting close to a size that should be considered for resection and has new symptoms. I am recommending an MRI w wo eovist and AFP. Return after MRI    A total of 35 minutes was spent reviewing the patient's chart, examining the patient, reviewing labs and imaging and coordinating care with the patient's care team.

## 2024-09-19 NOTE — TELEPHONE ENCOUNTER
----- Message from Yesenia Vallecillo MD sent at 9/19/2024  2:31 PM CDT -----  11:30 nov 7th for f/u virtually-need to open up the slot

## 2024-10-17 ENCOUNTER — OFFICE VISIT (OUTPATIENT)
Dept: FAMILY MEDICINE | Facility: CLINIC | Age: 39
End: 2024-10-17
Payer: COMMERCIAL

## 2024-10-17 VITALS
DIASTOLIC BLOOD PRESSURE: 72 MMHG | HEART RATE: 69 BPM | WEIGHT: 120.81 LBS | HEIGHT: 66 IN | BODY MASS INDEX: 19.42 KG/M2 | SYSTOLIC BLOOD PRESSURE: 110 MMHG | OXYGEN SATURATION: 98 %

## 2024-10-17 DIAGNOSIS — F41.1 GAD (GENERALIZED ANXIETY DISORDER): ICD-10-CM

## 2024-10-17 DIAGNOSIS — J45.20 MILD INTERMITTENT ASTHMA WITHOUT COMPLICATION: ICD-10-CM

## 2024-10-17 DIAGNOSIS — D13.4 ADENOMA OF LIVER: ICD-10-CM

## 2024-10-17 DIAGNOSIS — Z00.00 WELLNESS EXAMINATION: Primary | ICD-10-CM

## 2024-10-17 PROCEDURE — 3008F BODY MASS INDEX DOCD: CPT | Mod: CPTII,S$GLB,, | Performed by: FAMILY MEDICINE

## 2024-10-17 PROCEDURE — 1160F RVW MEDS BY RX/DR IN RCRD: CPT | Mod: CPTII,S$GLB,, | Performed by: FAMILY MEDICINE

## 2024-10-17 PROCEDURE — 3078F DIAST BP <80 MM HG: CPT | Mod: CPTII,S$GLB,, | Performed by: FAMILY MEDICINE

## 2024-10-17 PROCEDURE — 1159F MED LIST DOCD IN RCRD: CPT | Mod: CPTII,S$GLB,, | Performed by: FAMILY MEDICINE

## 2024-10-17 PROCEDURE — 3074F SYST BP LT 130 MM HG: CPT | Mod: CPTII,S$GLB,, | Performed by: FAMILY MEDICINE

## 2024-10-17 PROCEDURE — 99999 PR PBB SHADOW E&M-EST. PATIENT-LVL IV: CPT | Mod: PBBFAC,,, | Performed by: FAMILY MEDICINE

## 2024-10-17 PROCEDURE — 99395 PREV VISIT EST AGE 18-39: CPT | Mod: EP,S$GLB,, | Performed by: FAMILY MEDICINE

## 2024-10-17 RX ORDER — MONTELUKAST SODIUM 10 MG/1
10 TABLET ORAL NIGHTLY
Qty: 90 TABLET | Refills: 3 | Status: SHIPPED | OUTPATIENT
Start: 2024-10-17

## 2024-10-17 RX ORDER — ESCITALOPRAM OXALATE 10 MG/1
10 TABLET ORAL DAILY
Qty: 90 TABLET | Refills: 3 | Status: SHIPPED | OUTPATIENT
Start: 2024-10-17 | End: 2025-10-17

## 2024-10-17 NOTE — PROGRESS NOTES
Patient ID: Hayley Epstein is a 38 y.o. female.    Chief Complaint: Establish Care    History of Present Illness    CHIEF COMPLAINT:  Hayley presents today to establish care.    MEDICAL HISTORY:  She has a history of liver adenoma, which is being followed by hepatology with periodic scans. She has generalized anxiety disorder, stable on Lexapro. She also has mild intermittent asthma without complications, using rescue inhalers as needed and is followed by a pulmonary colleague. She reports having allergic constipation.    MEDICATIONS:  She is currently taking Lexapro for generalized anxiety disorder, Singulair and loratadine for allergy management, and uses rescue inhalers as needed for mild intermittent asthma. She denies needing anti-IgE monoclonal antibody therapy at this time.    GYNECOLOGICAL HISTORY:  She has been advised to avoid hormonal contraceptives due to her liver adenoma. She currently uses an IUD for contraception, having discontinued oral contraceptive pills.    PREVENTIVE CARE:  She is due for PCV20 and flu vaccine, which she plans to receive at her pharmacy. She is also due for lipid panel and TSH testing.      ROS:  General: -fever, -chills, -fatigue, -weight gain, -weight loss  Eyes: -vision changes, -redness, -discharge  ENT: -ear pain, -nasal congestion, -sore throat  Cardiovascular: -chest pain, -palpitations, -lower extremity edema  Respiratory: -cough, -shortness of breath  Gastrointestinal: -abdominal pain, -nausea, -vomiting, -diarrhea, +constipation, -blood in stool  Genitourinary: -dysuria, -hematuria, -frequency  Musculoskeletal: -joint pain, -muscle pain  Skin: -rash, -lesion  Neurological: -headache, -dizziness, -numbness, -tingling  Psychiatric: -anxiety, -depression, -sleep difficulty         Physical Exam    General: No acute distress. Well-developed. Well-nourished.  Eyes: EOMI. Sclerae anicteric.  HENT: Normocephalic. Atraumatic. Nares patent. Moist oral mucosa.  Ears:  Bilateral TMs clear. Bilateral EACs clear.  Cardiovascular: Regular rate. Regular rhythm. No murmurs. No rubs. No gallops. Normal S1, S2.  Respiratory: Normal respiratory effort. Clear to auscultation bilaterally. No rales. No rhonchi. No wheezing.  Abdomen: Soft. Non-tender. Non-distended. Normoactive bowel sounds.  Musculoskeletal: No  obvious deformity.  Extremities: No lower extremity edema.  Neurological: Alert & oriented x3. No slurred speech. Normal gait.  Psychiatric: Normal mood. Normal affect. Good insight. Good judgment.  Skin: Warm. Dry. No rash.         Assessment & Plan    Monitor adenoma of the liver with periodic scans by hepatology  Manage generalized anxiety disorder with Lexapro  Treat mild intermittent asthma with rescue inhalers and Singulair  Address allergic constipation without need for anti-IgE monoclonal antibody therapy at this time  Recommend vaccinations for wellness and health maintenance  Identify need for lipid panel and TSH testing    CONTRACEPTION:  - Confirmed the patient uses IUD for contraception.  - Advised the patient to avoid use of hormones, including oral contraceptives.  - Noted that the patient is off oral contraceptives and has been recommended not to use any hormones.    GENERALIZED ANXIETY DISORDER:  - Continued Lexapro for generalized anxiety disorder.  - Assessed that the patient is stable on Lexapro.    LIVER ADENOMA:  - Noted that the patient is being followed by hepatology for adenoma of the liver.  - Recommend periodic scanning of the liver adenoma.    ASTHMA:  - Continued rescue inhalers as needed for mild intermittent asthma.  - Continued Singulair for asthma management.  - Noted that the patient is followed by a pulmonary colleague.  - Determined that no anti-IgE monoclonal antibody is needed at this time.  - Confirmed that the patient uses rescue inhalers when needed and is on Singulair and loratadine.    VACCINATIONS:  - Assessed wellness health maintenance,  including the need for vaccinations.  - Recommend PCV20 and influenza vaccinations to be administered at the pharmacy.    ALLERGIC constitution  - Noted that the patient has allergic constipation.  - Continued loratadine.    LABS:  - Ordered lipid panel and TSH test.    FOLLOW UP:  - Scheduled follow-up visit in 1 year.             No follow-ups on file.    This note was generated with the assistance of ambient listening technology. Verbal consent was obtained by the patient and accompanying visitor(s) for the recording of patient appointment to facilitate this note. I attest to having reviewed and edited the generated note for accuracy, though some syntax or spelling errors may persist. Please contact the author of this note for any clarification.

## 2024-10-29 ENCOUNTER — LAB VISIT (OUTPATIENT)
Dept: LAB | Facility: HOSPITAL | Age: 39
End: 2024-10-29
Attending: FAMILY MEDICINE
Payer: COMMERCIAL

## 2024-10-29 DIAGNOSIS — D13.4 ADENOMA OF LIVER: ICD-10-CM

## 2024-10-29 DIAGNOSIS — Z00.00 WELLNESS EXAMINATION: ICD-10-CM

## 2024-10-29 LAB
CHOLEST SERPL-MCNC: 199 MG/DL (ref 120–199)
CHOLEST/HDLC SERPL: 2.6 {RATIO} (ref 2–5)
HCG INTACT+B SERPL-ACNC: <1.2 MIU/ML
HDLC SERPL-MCNC: 77 MG/DL (ref 40–75)
HDLC SERPL: 38.7 % (ref 20–50)
LDLC SERPL CALC-MCNC: 97.6 MG/DL (ref 63–159)
NONHDLC SERPL-MCNC: 122 MG/DL
TRIGL SERPL-MCNC: 122 MG/DL (ref 30–150)
TSH SERPL DL<=0.005 MIU/L-ACNC: 1.7 UIU/ML (ref 0.4–4)

## 2024-10-29 PROCEDURE — 82105 ALPHA-FETOPROTEIN SERUM: CPT | Performed by: INTERNAL MEDICINE

## 2024-10-29 PROCEDURE — 80061 LIPID PANEL: CPT | Performed by: FAMILY MEDICINE

## 2024-10-29 PROCEDURE — 84443 ASSAY THYROID STIM HORMONE: CPT | Performed by: FAMILY MEDICINE

## 2024-10-29 PROCEDURE — 84702 CHORIONIC GONADOTROPIN TEST: CPT | Performed by: INTERNAL MEDICINE

## 2024-10-30 LAB — AFP-TM SERPL-MCNC: 4.9 NG/ML

## 2024-10-31 ENCOUNTER — HOSPITAL ENCOUNTER (OUTPATIENT)
Dept: RADIOLOGY | Facility: HOSPITAL | Age: 39
Discharge: HOME | End: 2024-10-31
Attending: INTERNAL MEDICINE
Payer: COMMERCIAL

## 2024-10-31 DIAGNOSIS — D13.4 ADENOMA OF LIVER: ICD-10-CM

## 2024-10-31 PROCEDURE — 25500020 PHARM REV CODE 255: Performed by: INTERNAL MEDICINE

## 2024-10-31 PROCEDURE — A9581 GADOXETATE DISODIUM INJ: HCPCS | Performed by: INTERNAL MEDICINE

## 2024-10-31 PROCEDURE — 74183 MRI ABD W/O CNTR FLWD CNTR: CPT | Mod: TC

## 2024-10-31 PROCEDURE — 74183 MRI ABD W/O CNTR FLWD CNTR: CPT | Mod: 26,,, | Performed by: RADIOLOGY

## 2024-10-31 RX ADMIN — GADOXETATE DISODIUM 10 ML: 181.43 INJECTION, SOLUTION INTRAVENOUS at 09:10

## 2024-11-07 ENCOUNTER — EVALUATION (OUTPATIENT)
Dept: TRANSPLANT | Facility: CLINIC | Age: 39
End: 2024-11-07
Payer: COMMERCIAL

## 2024-11-07 VITALS
SYSTOLIC BLOOD PRESSURE: 115 MMHG | OXYGEN SATURATION: 99 % | TEMPERATURE: 97 F | RESPIRATION RATE: 18 BRPM | BODY MASS INDEX: 20.65 KG/M2 | DIASTOLIC BLOOD PRESSURE: 65 MMHG | WEIGHT: 128.5 LBS | HEIGHT: 66 IN | HEART RATE: 78 BPM

## 2024-11-07 DIAGNOSIS — D13.4 ADENOMA OF LIVER: Primary | ICD-10-CM

## 2024-11-07 PROCEDURE — 99999 PR PBB SHADOW E&M-EST. PATIENT-LVL IV: CPT | Mod: PBBFAC,,,

## 2024-11-07 NOTE — PROGRESS NOTES
Liver Transplant / Hepatobiliary Surgery  Clinic Note    Referring Provider: Yesenia Vallecillo MD     Subjective:     Reason for Visit: Hepatic adenoma    History of Present Illness: Hayley Epstein is a 38 y.o. female referred for consultation regarding hepatic adenoma. 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 Subsequent imaging studies demonstrated a mass in the left lobe of the liver with imaging features most consistent with hepatic adenoma. Over the past several years it has waxed and waned in size ranging from ~3.5-4cm diameter. she currently reports no explicit severe abdominal pain, but does report episodes of pre-syncope and vague upper abdominal pain. She has no history or risk factors for liver disease.    Review of Systems   Constitutional:  Negative for chills and fever.   HENT:  Negative for congestion and tinnitus.    Eyes:  Negative for discharge and redness.   Respiratory:  Negative for cough and shortness of breath.    Cardiovascular:  Negative for chest pain and palpitations.   Gastrointestinal:  Negative for diarrhea and vomiting.   Genitourinary:  Negative for frequency and urgency.   Musculoskeletal:  Negative for joint pain and myalgias.   Neurological:  Negative for tingling and weakness.   Endo/Heme/Allergies:  Does not bruise/bleed easily.   Psychiatric/Behavioral:  Negative for depression and suicidal ideas.       Objective:     Physical Exam  HENT:      Head: Normocephalic and atraumatic.   Eyes:      Pupils: Pupils are equal, round, and reactive to light.   Cardiovascular:      Rate and Rhythm: Normal rate and regular rhythm.   Pulmonary:      Effort: Pulmonary effort is normal. No respiratory distress.   Abdominal:      Palpations: Abdomen is soft.      Tenderness: There is no guarding.   Musculoskeletal:         General: No tenderness. Normal range of motion.      Cervical back: Normal range of motion.    Lymphadenopathy:      Cervical: No cervical adenopathy.   Skin:     General: Skin is warm and dry.   Neurological:      Mental Status: She is alert and oriented to person, place, and time.      Cranial Nerves: No cranial nerve deficit.   Psychiatric:         Mood and Affect: Affect normal.         Judgment: Judgment normal.          MELD-Na score: Computed MELD 3.0 unavailable. One or more values for this score either were not found within the given timeframe or did not fit some other criterion.  Computed MELD-Na unavailable. One or more values for this score either were not found within the given timeframe or did not fit some other criterion.       Sodium   Date Value Ref Range Status   08/19/2024 141 136 - 145 mmol/L Final     Potassium   Date Value Ref Range Status   08/19/2024 3.9 3.5 - 5.1 mmol/L Final     Chloride   Date Value Ref Range Status   08/19/2024 104 95 - 110 mmol/L Final     CO2   Date Value Ref Range Status   08/19/2024 25 23 - 29 mmol/L Final     Glucose   Date Value Ref Range Status   08/19/2024 77 70 - 110 mg/dL Final     BUN   Date Value Ref Range Status   08/19/2024 13 6 - 20 mg/dL Final     Creatinine   Date Value Ref Range Status   08/19/2024 1.0 0.5 - 1.4 mg/dL Final     Calcium   Date Value Ref Range Status   08/19/2024 9.7 8.7 - 10.5 mg/dL Final     Total Protein   Date Value Ref Range Status   08/19/2024 7.4 6.0 - 8.4 g/dL Final     Albumin   Date Value Ref Range Status   08/19/2024 4.4 3.5 - 5.2 g/dL Final     Total Bilirubin   Date Value Ref Range Status   08/19/2024 0.5 0.1 - 1.0 mg/dL Final     Comment:     For infants and newborns, interpretation of results should be based  on gestational age, weight and in agreement with clinical  observations.    Premature Infant recommended reference ranges:  Up to 24 hours.............<8.0 mg/dL  Up to 48 hours............<12.0 mg/dL  3-5 days..................<15.0 mg/dL  6-29 days.................<15.0 mg/dL       Alkaline Phosphatase   Date  Value Ref Range Status   08/19/2024 78 55 - 135 U/L Final     AST   Date Value Ref Range Status   08/19/2024 24 10 - 40 U/L Final     ALT   Date Value Ref Range Status   08/19/2024 22 10 - 44 U/L Final     Anion Gap   Date Value Ref Range Status   08/19/2024 12 8 - 16 mmol/L Final     eGFR if    Date Value Ref Range Status   02/23/2022 >60 >60 mL/min/1.73 m^2 Final     eGFR if non    Date Value Ref Range Status   02/23/2022 >60 >60 mL/min/1.73 m^2 Final     Comment:     Calculation used to obtain the estimated glomerular filtration  rate (eGFR) is the CKD-EPI equation.        Lab Results   Component Value Date    WBC 6.23 08/19/2024    HGB 13.6 08/19/2024    HCT 40.8 08/19/2024    MCV 92 08/19/2024     08/19/2024       Lab Results   Component Value Date    INR 1.0 02/23/2022       Oncology History    No history exists.           Diagnoses:  Problem List Items Addressed This Visit    None       Assessment/Plan:     I reviewed available imaging with the patient in clinic today and discussed in detail the diagnosis of hepatic adenoma. We also discussed in detail potential treatment options including liver resection and continued observation. The size of the adenoma is at the lower threshold for recommendations for resection (4-5cm). I discussed the potential risks of hepatic adenoma including rupture and bleeding (~4%) and malignant transformation over time (4-8% depending on subtype, size and growth). The risks and benefits of liver resection were discussed in detail, including the risk of liver failure, bleeding, infection, bile leak, cardiovascular complications and inability to perform the procedure due to technical factors.    I do not believe there is an urgent need for intervention. She will consider the surgical option and determine an appropriate time for surgery. She has 3 young children and the summer may be a better time for her.      Victor Hugo Jean MD  Liver Transplant /  Hepatobiliary Surgery  [facility]

## 2024-11-07 NOTE — LETTER
November 7, 2024        Yesenia Vallecillo MD  [address] [facility] [address]             [facility] [address]  [address]  [facility] [address]  Phone: [phone]   Patient: Hayley Epstein   MR Number: [id number]   YOB: 1985   Date of Visit: 11/7/2024       Dear Dr. Vallecillo:    Thank you for referring Hayley Epstein to me for evaluation. Attached you will find relevant portions of my assessment and plan of care.    If you have questions, please do not hesitate to call me. I look forward to following Hayley Epstein along with you.    Sincerely,      Victor Hugo Jean MD            CC  No Recipients    Enclosure

## 2024-11-08 ENCOUNTER — TELEPHONE (OUTPATIENT)
Dept: HEPATOLOGY | Facility: CLINIC | Age: 39
End: 2024-11-08
Payer: COMMERCIAL

## 2024-11-08 ENCOUNTER — PATIENT MESSAGE (OUTPATIENT)
Dept: HEPATOLOGY | Facility: CLINIC | Age: 39
End: 2024-11-08
Payer: COMMERCIAL

## 2024-11-08 DIAGNOSIS — D13.4 HEPATIC ADENOMA: Primary | ICD-10-CM

## 2025-01-03 ENCOUNTER — PATIENT MESSAGE (OUTPATIENT)
Dept: SURGERY | Facility: CLINIC | Age: 40
End: 2025-01-03

## 2025-01-03 ENCOUNTER — OFFICE VISIT (OUTPATIENT)
Dept: SURGERY | Facility: CLINIC | Age: 40
End: 2025-01-03
Payer: COMMERCIAL

## 2025-01-03 VITALS
TEMPERATURE: 98 F | BODY MASS INDEX: 20.8 KG/M2 | OXYGEN SATURATION: 99 % | DIASTOLIC BLOOD PRESSURE: 60 MMHG | HEART RATE: 81 BPM | WEIGHT: 129.44 LBS | RESPIRATION RATE: 16 BRPM | HEIGHT: 66 IN | SYSTOLIC BLOOD PRESSURE: 118 MMHG

## 2025-01-03 DIAGNOSIS — K60.2 ANAL FISSURE: Primary | ICD-10-CM

## 2025-01-03 DIAGNOSIS — K60.1 CHRONIC ANAL FISSURE: Primary | ICD-10-CM

## 2025-01-03 PROCEDURE — 99213 OFFICE O/P EST LOW 20 MIN: CPT | Mod: S$GLB,,, | Performed by: COLON & RECTAL SURGERY

## 2025-01-03 PROCEDURE — 3074F SYST BP LT 130 MM HG: CPT | Mod: CPTII,S$GLB,, | Performed by: COLON & RECTAL SURGERY

## 2025-01-03 PROCEDURE — 3008F BODY MASS INDEX DOCD: CPT | Mod: CPTII,S$GLB,, | Performed by: COLON & RECTAL SURGERY

## 2025-01-03 PROCEDURE — 3078F DIAST BP <80 MM HG: CPT | Mod: CPTII,S$GLB,, | Performed by: COLON & RECTAL SURGERY

## 2025-01-03 PROCEDURE — 1159F MED LIST DOCD IN RCRD: CPT | Mod: CPTII,S$GLB,, | Performed by: COLON & RECTAL SURGERY

## 2025-01-03 PROCEDURE — 99999 PR PBB SHADOW E&M-EST. PATIENT-LVL IV: CPT | Mod: PBBFAC,,, | Performed by: COLON & RECTAL SURGERY

## 2025-01-03 NOTE — PROGRESS NOTES
PI:  Hayley Epstein is a 39 y.o. female with history of anal pain, worsening constipation  History of constipation, changed after childbirth.    Hard to evacuate.  Cannot do it if formed.  Straining to stool  Protrusion.    BM's daily, tend to be loose  Uses fiber, magox, miralax weekly.    No change in abd discomfort - ? Liver adenoma pain    Blood on TP and in TB    Post partum wt loss      Past Medical History:   Diagnosis Date    Adenoma of liver     Asthma     since covid    Asthma 05/01/2022    COVID-19 virus detected 03/2020    Post partum depression 05/01/2022    Postpartum depression     post-partum, on lexapro        Past Surgical History:   Procedure Laterality Date    ADENOIDECTOMY      BRONCHOSCOPY N/A 11/12/2019    Procedure: BRONCHOSCOPY;  Surgeon: Raul Fiore MD;  Location: [facility];  Service: Pulmonary;  Laterality: N/A;    TONSILLECTOMY      and adenoids at 14    WISDOM TOOTH EXTRACTION      all 4; at 19       Review of patient's allergies indicates:   Allergen Reactions    Sulfa (sulfonamide antibiotics) Hives    Penicillins Rash       Family History   Problem Relation Name Age of Onset    Miscarriages / Stillbirths Mother          miscarriage x6.     Hypertension Father      Hyperlipidemia Father      Alcohol abuse Father      Depression Father      Celiac disease Maternal Aunt      Stroke Maternal Grandmother          Hx of AF; CVA's numerous; vascular dementia.     Melanoma Paternal Grandmother      Glaucoma Neg Hx         Social History     Socioeconomic History    Marital status:      Spouse name: Bernabe    Number of children: 1   Occupational History    Occupation: Pulmonologist.   Tobacco Use    Smoking status: Never     Passive exposure: Never    Smokeless tobacco: Never   Substance and Sexual Activity    Alcohol use: Not Currently    Drug use: Never    Sexual activity: Yes     Partners: Male     Social Drivers of Health     Financial Resource Strain: Low Risk  (2/7/2024)     "Overall Financial Resource Strain (CARDIA)     Difficulty of Paying Living Expenses: Not hard at all   Food Insecurity: No Food Insecurity (4/11/2024)    Hunger Vital Sign     Worried About Running Out of Food in the Last Year: Never true     Ran Out of Food in the Last Year: Never true   Transportation Needs: No Transportation Needs (4/11/2024)    PRAPARE - Transportation     Lack of Transportation (Medical): No     Lack of Transportation (Non-Medical): No   Physical Activity: Unknown (2/7/2024)    Exercise Vital Sign     Days of Exercise per Week: Patient declined   Stress: No Stress Concern Present (2/7/2024)    [employer] - Occupational Stress Questionnaire     Feeling of Stress : Only a little   Housing Stability: Low Risk  (4/11/2024)    Housing Stability Vital Sign     Unable to Pay for Housing in the Last Year: No     Number of Places Lived in the Last Year: 1     Unstable Housing in the Last Year: No       ROS:  GENERAL: No fever, chills, fatigability or weight loss.  Integument: No rashes, redness, icterus  CHEST: Denies CROSS, cyanosis, wheezing, cough and sputum production.  CARDIOVASCULAR: Denies chest pain, PND, orthopnea or reduced exercise tolerance.  GI: Denies abd pain, dysphagia, nausea, vomiting, no hematemesis   : Denies burning on urination, no hematuria, no bacteriuria  MSK: No deformities, swelling, joint pain swelling  Neurologic: No HAs, seizures, weakness, paresthesias, gait problems    PE:  General appearance healthy in NAD  /60 (BP Location: Left arm, Patient Position: Sitting)   Pulse 81   Temp 97.7 °F (36.5 °C) (Temporal)   Resp 16   Ht 5' 6" (1.676 m)   Wt 58.7 kg (129 lb 6.6 oz)   LMP  (LMP Unknown) Comment: Currently Breastfeeding, no periods for over a year.  SpO2 99%   Breastfeeding Yes   BMI 20.89 kg/m²   Sclera/ Skin anicteric  AT NC EOMI  Neck supple trachea midline   Chest symmetric, nl excursion, no retractions, breathing " comfortably  EXT - no CCE  Neuro:  Mood/ affect nl, alert and oriented x 3, moves all ext's, gait nl    Rectal  Inspection   Anterior anal fissure.  Marked spasm  GEORGE increase tone, good squeeze, nl relaxation      Assessment:  Anal fissure, chronic    Plan:  1. SOAK TID in tub warm water only (or hand held shower to anus)  2. Diltiazem cream TID  3.  High fiber diet - 25 grams per day.  Emphasis on whole grain breads such as double fiber wheat bread and high fiber cereals and bars.    Drink 8 glasses of water per day 64 - 96 oz per day  Fiber supplements such as KONSYL, METAMUCIL can be taken 1-2 x per day  Regular exercise - 30 min brisk walking 5 days per week  4.  RTC 6 weeks.

## 2025-04-02 DIAGNOSIS — J45.50 SEVERE PERSISTENT ASTHMA WITHOUT COMPLICATION: Primary | ICD-10-CM

## 2025-04-02 RX ORDER — ALBUTEROL SULFATE AND BUDESONIDE 90; 80 UG/1; UG/1
2 AEROSOL, METERED RESPIRATORY (INHALATION) EVERY 4 HOURS PRN
Qty: 10.7 G | Refills: 11 | Status: SHIPPED | OUTPATIENT
Start: 2025-04-02

## 2025-05-30 ENCOUNTER — HOSPITAL ENCOUNTER (OUTPATIENT)
Dept: PREADMISSION TESTING | Facility: HOSPITAL | Age: 40
Discharge: HOME | End: 2025-05-30
Attending: INTERNAL MEDICINE
Payer: COMMERCIAL

## 2025-05-30 VITALS
OXYGEN SATURATION: 100 % | BODY MASS INDEX: 20.09 KG/M2 | HEART RATE: 82 BPM | SYSTOLIC BLOOD PRESSURE: 115 MMHG | RESPIRATION RATE: 18 BRPM | TEMPERATURE: 98 F | HEIGHT: 66 IN | WEIGHT: 125 LBS | DIASTOLIC BLOOD PRESSURE: 76 MMHG

## 2025-05-30 DIAGNOSIS — Z01.818 PRE-OP TESTING: Primary | ICD-10-CM

## 2025-05-30 NOTE — DISCHARGE INSTRUCTIONS
To confirm, Your procedure is scheduled for: Tuesday, Melanie 3, 2025    Endoscopy will call the afternoon prior with the final arrival time Monday, June 2, 2025    Please report to [facility] via [facility] [address] entrance. Check in at registration desk.    Do not eat anything after midnight the night before procedure.    You may have clear liquids up until 2 hours prior to arrival at hospital.    *Clear liquids include: WATER, GATORADE, CLEAR JUICES (NO PULP), BLACK COFFEE OR TEA.        DO NOT TAKE THESE MEDICATIONS PRIOR to your procedure or per your surgeon's request: ASPIRIN, ALEVE, ADVIL, IBUPROFEN, FISH OIL VITAMIN E, HERBALS  (May take Tylenol)    INSTRUCTIONS IMPORTANT!!    Do not smoke, vape or drink alcoholic beverages 24 hours prior to your procedure.      Please leave all jewelry, piercing's and valuables at home.     Make arrangements in advance for transportation home by a responsible adult.    You must make arrangements for transportation, TAXI'S, UBER'S OR LYFTS ARE NOT ALLOWED.        If you have any questions about these instructions, call Pre-Op Admit  Nursing at [phone] or the Pre-Op Endoscopy [phone]

## 2025-05-30 NOTE — PRE-PROCEDURE INSTRUCTIONS
History and medicines reviewed and updated with patient. Verbalized understanding of all pre op instructions as per AVS.

## 2025-06-03 ENCOUNTER — ANESTHESIA (OUTPATIENT)
Dept: SURGERY | Facility: HOSPITAL | Age: 40
End: 2025-06-03
Payer: COMMERCIAL

## 2025-06-03 ENCOUNTER — HOSPITAL ENCOUNTER (OUTPATIENT)
Facility: HOSPITAL | Age: 40
Discharge: HOME | End: 2025-06-03
Attending: INTERNAL MEDICINE | Admitting: INTERNAL MEDICINE
Payer: COMMERCIAL

## 2025-06-03 ENCOUNTER — ANESTHESIA EVENT (OUTPATIENT)
Dept: SURGERY | Facility: HOSPITAL | Age: 40
End: 2025-06-03
Payer: COMMERCIAL

## 2025-06-03 VITALS
OXYGEN SATURATION: 98 % | HEIGHT: 66 IN | TEMPERATURE: 98 F | BODY MASS INDEX: 20.09 KG/M2 | HEART RATE: 55 BPM | WEIGHT: 125 LBS | RESPIRATION RATE: 18 BRPM | DIASTOLIC BLOOD PRESSURE: 63 MMHG | SYSTOLIC BLOOD PRESSURE: 97 MMHG

## 2025-06-03 VITALS
RESPIRATION RATE: 15 BRPM | OXYGEN SATURATION: 99 % | SYSTOLIC BLOOD PRESSURE: 115 MMHG | HEART RATE: 64 BPM | TEMPERATURE: 99 F | DIASTOLIC BLOOD PRESSURE: 58 MMHG

## 2025-06-03 DIAGNOSIS — R13.10 DYSPHAGIA: ICD-10-CM

## 2025-06-03 PROCEDURE — 63600175 PHARM REV CODE 636 W HCPCS

## 2025-06-03 PROCEDURE — C1769 GUIDE WIRE: HCPCS | Performed by: INTERNAL MEDICINE

## 2025-06-03 PROCEDURE — 25000003 PHARM REV CODE 250

## 2025-06-03 PROCEDURE — 37000008 HC ANESTHESIA 1ST 15 MINUTES: Performed by: INTERNAL MEDICINE

## 2025-06-03 PROCEDURE — 37000009 HC ANESTHESIA EA ADD 15 MINS: Performed by: INTERNAL MEDICINE

## 2025-06-03 PROCEDURE — 43248 EGD GUIDE WIRE INSERTION: CPT | Performed by: INTERNAL MEDICINE

## 2025-06-03 PROCEDURE — 43239 EGD BIOPSY SINGLE/MULTIPLE: CPT | Mod: XS | Performed by: INTERNAL MEDICINE

## 2025-06-03 RX ORDER — PROPOFOL 10 MG/ML
VIAL (ML) INTRAVENOUS
Status: DISCONTINUED | OUTPATIENT
Start: 2025-06-03 | End: 2025-06-03

## 2025-06-03 RX ORDER — LIDOCAINE HYDROCHLORIDE 20 MG/ML
INJECTION, SOLUTION EPIDURAL; INFILTRATION; INTRACAUDAL; PERINEURAL
Status: DISCONTINUED | OUTPATIENT
Start: 2025-06-03 | End: 2025-06-03

## 2025-06-03 RX ADMIN — PROPOFOL 50 MG: 10 INJECTION, EMULSION INTRAVENOUS at 10:06

## 2025-06-03 RX ADMIN — SODIUM CHLORIDE: 9 INJECTION, SOLUTION INTRAVENOUS at 10:06

## 2025-06-03 RX ADMIN — LIDOCAINE HYDROCHLORIDE 100 MG: 20 INJECTION, SOLUTION INTRAVENOUS at 10:06

## 2025-06-03 RX ADMIN — PROPOFOL 100 MG: 10 INJECTION, EMULSION INTRAVENOUS at 10:06

## 2025-06-03 NOTE — H&P
GASTROENTEROLOGY PRE-PROCEDURE H&P NOTE  Patient Name: Hayley Epstein  Patient MRN: [id number]  Patient : 1985    Service date: 6/3/2025    PCP: Dani Cowan MD    No chief complaint on file.      HPI: Patient is a 39 y.o. female with PMHx as below here for evaluation of GERD dysphagia.     Past Medical History:  Past Medical History:   Diagnosis Date    Adenoma of liver     Asthma     since covid    Asthma 2022    COVID-19 virus detected 2020    Post partum depression 2022    Postpartum depression     post-partum, on lexapro        Past Surgical History:  Past Surgical History:   Procedure Laterality Date    ADENOIDECTOMY      BRONCHOSCOPY N/A 2019    Procedure: BRONCHOSCOPY;  Surgeon: Raul Fiore MD;  Location: [facility];  Service: Pulmonary;  Laterality: N/A;    TONSILLECTOMY      and adenoids at 14    WISDOM TOOTH EXTRACTION      all 4; at 19        Home Medications:  Prescriptions Prior to Admission[1]            Review of patient's allergies indicates:   Allergen Reactions    Sulfa (sulfonamide antibiotics) Hives    Penicillins Rash       Social History:   Social History     Occupational History    Occupation: Pulmonologist.   Tobacco Use    Smoking status: Never     Passive exposure: Never    Smokeless tobacco: Never   Substance and Sexual Activity    Alcohol use: Not Currently     Comment: wine every one day or less    Drug use: Never    Sexual activity: Yes     Partners: Male       Family History:   Family History   Problem Relation Name Age of Onset    Miscarriages / Stillbirths Mother          miscarriage x6.     Hypertension Father      Hyperlipidemia Father      Alcohol abuse Father      Depression Father      Celiac disease Maternal Aunt      Stroke Maternal Grandmother          Hx of AF; CVA's numerous; vascular dementia.     Melanoma Paternal Grandmother      Glaucoma Neg Hx         Review of Systems:  A 10 point review of systems was performed and was  "normal, except as mentioned in the HPI, including constitutional, HEENT, heme, lymph, cardiovascular, respiratory, gastrointestinal, genitourinary, neurologic, endocrine, psychiatric and musculoskeletal.      OBJECTIVE:    Physical Exam:  24 Hour Vital Sign Ranges: Temp:  [97.7 °F (36.5 °C)] 97.7 °F (36.5 °C)  Pulse:  [70] 70  Resp:  [16] 16  SpO2:  [100 %] 100 %  BP: (107)/(59) 107/59  Most recent vitals: BP (!) 107/59 (BP Location: Left arm, Patient Position: Lying)   Pulse 70   Temp 97.7 °F (36.5 °C) (Temporal)   Resp 16   Ht 5' 6" (1.676 m)   Wt 56.7 kg (125 lb)   LMP 05/27/2025 (Exact Date)   SpO2 100%   Breastfeeding Yes   BMI 20.18 kg/m²    GEN: well-developed, well-nourished, awake and alert, non-toxic appearing adult  HEENT: PERRL, sclera anicteric, oral mucosa pink and moist without lesion  NECK: trachea midline; Good ROM  CV: regular rate and rhythm, no murmurs or gallops  RESP: clear to auscultation bilaterally, no wheezes, rhonci or rales  ABD: soft, non-tender, non-distended, normal bowel sounds  EXT: no swelling or edema, 2+ pulses distally  SKIN: no rashes or jaundice  PSYCH: normal affect    Labs:   No results for input(s): "WBC", "MCV", "PLT" in the last 72 hours.    Invalid input(s): "HGBAU"  No results for input(s): "NA", "K", "CL", "CO2", "BUN", "GLU" in the last 72 hours.    Invalid input(s): "CREA"  No results for input(s): "ALB" in the last 72 hours.    Invalid input(s): "ALKP", "SGOT", "SGPT", "TBIL", "DBIL", "TPRO"  No results for input(s): "PT", "INR", "PTT" in the last 72 hours.      IMPRESSION / RECOMMENDATIONS:  EGD with interventions as warranted.   RIsks, benefits, alternatives discussed in detail regarding upcoming procedures and sedation. Some of the more common endoscopic complications include but not limited to immediate or delayed perforation, bleeding, infections, pain, inadvertent injury to surrounding tissue / organs and possible need for surgical evaluation. Patient " expressed understanding, all questions answered and will proceed with procedure as planned.     Calos PATTERSON Dauterive III  6/3/2025  10:22 AM           [1]   Medications Prior to Admission   Medication Sig Dispense Refill Last Dose/Taking    albuterol (PROVENTIL/VENTOLIN HFA) 90 mcg/actuation inhaler Inhale 2 puffs into the lungs every 4 (four) hours as needed for Wheezing or Shortness of Breath (cough). 2 puffs every 4 hours as needed for cough, wheeze, or shortness of breath 18 g 11     albuterol-budesonide (AIRSUPRA) 90-80 mcg/actuation Inhale 2 puffs into the lungs every 4 (four) hours as needed (shortness of breath). 10.7 g 11     cetirizine HCl (ZYRTEC ORAL) Take 1 tablet by mouth Daily.       diltiazem HCl (DILTIAZEM 2% CREAM) Apply topically 3 (three) times daily. Apply topically to anal area. 30 g 0     EScitalopram oxalate (LEXAPRO) 10 MG tablet Take 1 tablet (10 mg total) by mouth once daily. 90 tablet 3     fluticasone propionate (FLONASE) 50 mcg/actuation nasal spray 2 sprays by Each Nostril route once daily.       MAGNESIUM ORAL Take 1 tablet by mouth nightly.       montelukast (SINGULAIR) 10 mg tablet Take 1 tablet (10 mg total) by mouth every evening. (Patient taking differently: Take 10 mg by mouth once daily.) 90 tablet 3     PARAGARD T 380A 380 square mm IUD        prenatal vit,meet 74/iron/folic (PRENATAL VITAMIN 1+1 ORAL) Take 1 tablet by mouth Daily.       psyllium seed, with sugar, (FIBER ORAL) Take 3 capsules by mouth Daily.       triamcinolone acetonide 0.1% (KENALOG) 0.1 % cream AAA bid (Patient taking differently: 3 (three) times daily. AAA bid) 80 g 3

## 2025-06-25 DIAGNOSIS — J02.9 SORE THROAT: ICD-10-CM

## 2025-06-25 DIAGNOSIS — J45.21 MILD INTERMITTENT ASTHMA WITH ACUTE EXACERBATION: ICD-10-CM

## 2025-06-25 RX ORDER — AZITHROMYCIN 250 MG/1
TABLET, FILM COATED ORAL
Qty: 6 TABLET | Refills: 2 | Status: SHIPPED | OUTPATIENT
Start: 2025-06-25 | End: 2025-06-30

## 2025-08-26 ENCOUNTER — PATIENT MESSAGE (OUTPATIENT)
Dept: FAMILY MEDICINE | Facility: CLINIC | Age: 40
End: 2025-08-26
Payer: COMMERCIAL

## 2025-08-29 ENCOUNTER — OFFICE VISIT (OUTPATIENT)
Dept: FAMILY MEDICINE | Facility: CLINIC | Age: 40
End: 2025-08-29
Payer: COMMERCIAL

## 2025-08-29 DIAGNOSIS — K21.00 GASTROESOPHAGEAL REFLUX DISEASE WITH ESOPHAGITIS, UNSPECIFIED WHETHER HEMORRHAGE: Primary | ICD-10-CM

## 2025-08-29 RX ORDER — ESOMEPRAZOLE MAGNESIUM 40 MG/1
40 CAPSULE, DELAYED RELEASE ORAL
Qty: 90 CAPSULE | Refills: 3 | Status: SHIPPED | OUTPATIENT
Start: 2025-08-29 | End: 2026-08-29